# Patient Record
Sex: FEMALE | Race: WHITE | NOT HISPANIC OR LATINO
[De-identification: names, ages, dates, MRNs, and addresses within clinical notes are randomized per-mention and may not be internally consistent; named-entity substitution may affect disease eponyms.]

---

## 2017-11-27 ENCOUNTER — APPOINTMENT (OUTPATIENT)
Dept: ENDOCRINOLOGY | Facility: CLINIC | Age: 22
End: 2017-11-27
Payer: COMMERCIAL

## 2017-11-27 VITALS
BODY MASS INDEX: 27.6 KG/M2 | HEART RATE: 102 BPM | HEIGHT: 62 IN | WEIGHT: 150 LBS | DIASTOLIC BLOOD PRESSURE: 70 MMHG | OXYGEN SATURATION: 98 % | SYSTOLIC BLOOD PRESSURE: 112 MMHG

## 2017-11-27 DIAGNOSIS — Z78.9 OTHER SPECIFIED HEALTH STATUS: ICD-10-CM

## 2017-11-27 PROCEDURE — 99204 OFFICE O/P NEW MOD 45 MIN: CPT | Mod: 25

## 2017-11-27 PROCEDURE — 76536 US EXAM OF HEAD AND NECK: CPT

## 2017-11-29 LAB
T4 FREE SERPL-MCNC: 1.3 NG/DL
THYROGLOB AB SERPL-ACNC: 58.8 IU/ML
THYROPEROXIDASE AB SERPL IA-ACNC: 23.6 IU/ML
TSH SERPL-ACNC: 0.92 UIU/ML

## 2017-12-15 ENCOUNTER — RX RENEWAL (OUTPATIENT)
Age: 22
End: 2017-12-15

## 2018-12-31 ENCOUNTER — RX RENEWAL (OUTPATIENT)
Age: 23
End: 2018-12-31

## 2019-03-18 ENCOUNTER — APPOINTMENT (OUTPATIENT)
Dept: ENDOCRINOLOGY | Facility: CLINIC | Age: 24
End: 2019-03-18
Payer: COMMERCIAL

## 2019-03-18 VITALS
DIASTOLIC BLOOD PRESSURE: 70 MMHG | WEIGHT: 149 LBS | HEIGHT: 62 IN | OXYGEN SATURATION: 99 % | BODY MASS INDEX: 27.42 KG/M2 | SYSTOLIC BLOOD PRESSURE: 110 MMHG | HEART RATE: 99 BPM

## 2019-03-18 PROCEDURE — 99213 OFFICE O/P EST LOW 20 MIN: CPT

## 2019-03-18 RX ORDER — METRONIDAZOLE 7.5 MG/G
0.75 GEL TOPICAL
Qty: 45 | Refills: 0 | Status: DISCONTINUED | COMMUNITY
Start: 2019-02-07

## 2019-03-18 NOTE — PHYSICAL EXAM
[Alert] : alert [No Acute Distress] : no acute distress [Well Nourished] : well nourished [Well Developed] : well developed [Normal Sclera/Conjunctiva] : normal sclera/conjunctiva [EOMI] : extra ocular movement intact [Normal Oropharynx] : the oropharynx was normal [No Respiratory Distress] : no respiratory distress [Thyroid Not Enlarged] : the thyroid was not enlarged [Supple] : the neck was supple [No Accessory Muscle Use] : no accessory muscle use [Normal Rate and Effort] : normal respiratory rhythm and effort [Normal Rate] : heart rate was normal  [Clear to Auscultation] : lungs were clear to auscultation bilaterally [Normal S1, S2] : normal S1 and S2 [Regular Rhythm] : with a regular rhythm [Normal Bowel Sounds] : normal bowel sounds [No Edema] : there was no peripheral edema [Soft] : abdomen soft [Not Tender] : non-tender [Not Distended] : not distended [No Spinal Tenderness] : no spinal tenderness [No Stigmata of Cushings Syndrome] : no stigmata of cushings syndrome [Normal Gait] : normal gait [No Clubbing, Cyanosis] : no clubbing  or cyanosis of the fingernails [Normal Strength/Tone] : muscle strength and tone were normal [No Motor Deficits] : the motor exam was normal [No Tremors] : no tremors [Oriented x3] : oriented to person, place, and time [Normal Affect] : the affect was normal [Normal Mood] : the mood was normal [Acanthosis Nigricans] : no acanthosis nigricans [Kyphosis] : no kyphosis present

## 2019-03-18 NOTE — HISTORY OF PRESENT ILLNESS
[FreeTextEntry1] : 22 y/o F w/ hx of hypothyroidism diagnosed 10/2016 on evaluation for "swollen neck" also w/ hx of difficulty losing weight. Thyroid function tests "abnormal" referred to Dr. Yeh who started on levothyroxine 75 mcg  daily. Decreased to 6 days a week 7/2017 due to hair loss. Seen initially by me 11/2017 with TSH of 0.97 Free T4 1.3 TPO Ab neg. and TG Ab +. Had not noticed much improvement on lower dose. Takes daily in the morning on empty stomach. No missed doses. No known family hx of thyroid condition. Has noticed less swelling of her neck. Denies dysphagia, dysphonia. No radiation exposure. never on lithium or amiodarone. Denies palpitations. +difficulty losing weight. Weight stable. Denies constipation, diarrhea. +cold intolerance, hair loss, dry skin and fatigue. LMP 2/15/19 should be coming tomorrow. Regular. Had normal thyroid US in 2017. No fevers or chills.

## 2019-03-18 NOTE — DATA REVIEWED
[FreeTextEntry1] : Thyroid Ultrasound Report 11/27/17:\par \par Technique: multiple real time longitudinal and transverse images were obtained using a high resolution ultrasound with a linear transducer, [employer] Blair e 2008 model, 10-12 MHz frequencies. All measurements will be reported as longitudinal x ana-posterior x transverse. \par Comparison: None. \par \par Indication: goiter and hypothyroidism. \par \par Findings: \par The right thyroid lobe measures 3.58 x 1.44 x 1.14 cm. The left thyroid lobe measures 3.89 x 1.07 x 1.16 cm. The isthmus measures 0.38 cm. \par The right thyroid lobe has a homogenous parenchyma. \par The left thyroid lobe has a homogeneous parenchyma . \par \par There are no distinct nodules visualized. \par

## 2019-03-18 NOTE — PROCEDURE
[iSkoot e 2008 model, 10-12 MHz frequencies] : multiple real time longitudinal and transverse images were obtained using a high resolution ultrasound with a linear transducer, iSkoot e 2008 model, 10-12 MHz frequencies. All measurements will be reported as longitudinal x ana-posterior x transverse. [None] : None [Goiter] : goiter [Hypothyroidism] : hypothyroidism [There are no distinct nodules visualized.] : There are no distinct nodules visualized. [] : a homogeneous parenchyma  [FreeTextEntry1] : 3.58 x 1.44 x 1.14 [FreeTextEntry2] : 0.38 [FreeTextEntry5] : 3.89 x 1.07 x 1.16

## 2019-03-18 NOTE — REVIEW OF SYSTEMS
[Fatigue] : fatigue [Blurry Vision] : blurred vision [Headache] : headaches [All other systems negative] : All other systems negative [Hair Loss] : hair loss [Dry Skin] : dry skin [Cold Intolerance] : cold intolerant [Recent Weight Gain (___ Lbs)] : no recent weight gain [Recent Weight Loss (___ Lbs)] : no recent weight loss [Dysphagia] : no dysphagia [Dysphonia] : no dysphonia [Neck Pain] : no neck pain [Chest Pain] : no chest pain [Shortness Of Breath] : no shortness of breath [Palpitations] : no palpitations [Nausea] : no nausea [Vomiting] : no vomiting was observed [Constipation] : no constipation [Diarrhea] : no diarrhea [Polyuria] : no polyuria [Irregular Menses] : regular menses [Joint Pain] : no joint pain [Muscle Weakness] : no muscle weakness [Tremors] : no tremors [Polydipsia] : no polydipsia [Heat Intolerance] : heat tolerant [FreeTextEntry3] : with migraines [FreeTextEntry2] : difficulty losing weight [de-identified] : migraines

## 2019-03-18 NOTE — ASSESSMENT
[FreeTextEntry1] : 24 y/o F w/ hypothyroidism. Check TFTs today. Adjust dose as needed for goal TSH of 0.5-3. Thyroid US performed in 2017 given hx of neck swelling and hypothyroidism (see results section for full description) with normal appearing thyroid gland. Rule out other causes of cold intolerance and fatigue with CBC, vitamin D, and celiac screen.

## 2019-03-19 LAB
25(OH)D3 SERPL-MCNC: 33.1 NG/ML
BASOPHILS # BLD AUTO: 0.04 K/UL
BASOPHILS NFR BLD AUTO: 0.6 %
EOSINOPHIL # BLD AUTO: 0.11 K/UL
EOSINOPHIL NFR BLD AUTO: 1.7 %
HCT VFR BLD CALC: 38.4 %
HGB BLD-MCNC: 12.4 G/DL
IMM GRANULOCYTES NFR BLD AUTO: 0.5 %
LYMPHOCYTES # BLD AUTO: 1.59 K/UL
LYMPHOCYTES NFR BLD AUTO: 25 %
MAN DIFF?: NORMAL
MCHC RBC-ENTMCNC: 30.2 PG
MCHC RBC-ENTMCNC: 32.3 GM/DL
MCV RBC AUTO: 93.7 FL
MONOCYTES # BLD AUTO: 0.4 K/UL
MONOCYTES NFR BLD AUTO: 6.3 %
NEUTROPHILS # BLD AUTO: 4.19 K/UL
NEUTROPHILS NFR BLD AUTO: 65.9 %
PLATELET # BLD AUTO: 288 K/UL
RBC # BLD: 4.1 M/UL
RBC # FLD: 12.5 %
T4 FREE SERPL-MCNC: 1.2 NG/DL
TSH SERPL-ACNC: 1.83 UIU/ML
WBC # FLD AUTO: 6.36 K/UL

## 2019-03-20 LAB
TTG IGA SER IA-ACNC: <1.2 U/ML
TTG IGA SER-ACNC: NEGATIVE
TTG IGG SER IA-ACNC: 1.3 U/ML
TTG IGG SER IA-ACNC: NEGATIVE

## 2019-05-29 ENCOUNTER — APPOINTMENT (OUTPATIENT)
Dept: SURGERY | Facility: CLINIC | Age: 24
End: 2019-05-29
Payer: COMMERCIAL

## 2019-05-29 VITALS — HEIGHT: 63 IN | BODY MASS INDEX: 26.58 KG/M2 | WEIGHT: 150 LBS

## 2019-05-29 PROCEDURE — 99202 OFFICE O/P NEW SF 15 MIN: CPT | Mod: 25,57

## 2019-05-29 PROCEDURE — 10081 I&D PILONIDAL CYST COMP: CPT

## 2019-05-30 NOTE — HISTORY OF PRESENT ILLNESS
[de-identified] : This 23 year old woman developed a painful mass in the area overlying her "tailbone" several days ago. She denies fever or chills nor [previous similar problems.

## 2019-05-30 NOTE — ASSESSMENT
[FreeTextEntry1] : Long discussion regarding all options and risks\par \par I&D of large pilonidal abscess\par 1.0 % xylocaine\par Large amount of pus and blood clots drained\par Multiple deep loculations divide\par \par Wound care discussed \par To begin sitz baths\par Return in two days

## 2019-05-30 NOTE — PHYSICAL EXAM
[Normal Breath Sounds] : Normal breath sounds [Normal Heart Sounds] : normal heart sounds [Normal Rate and Rhythm] : normal rate and rhythm [Abdominal Masses] : No abdominal masses [Abdomen Tenderness] : ~T ~M No abdominal tenderness [de-identified] : nl [de-identified] : nl [de-identified] : nl [de-identified] : 3.5 cm. tender mass overlying the coccyx

## 2019-05-31 ENCOUNTER — APPOINTMENT (OUTPATIENT)
Dept: SURGERY | Facility: CLINIC | Age: 24
End: 2019-05-31
Payer: COMMERCIAL

## 2019-05-31 PROCEDURE — 99024 POSTOP FOLLOW-UP VISIT: CPT

## 2019-06-01 NOTE — HISTORY OF PRESENT ILLNESS
[de-identified] : The patient is feeling much better\par There is still some drainage as she continues the sitz baths

## 2019-06-17 ENCOUNTER — APPOINTMENT (OUTPATIENT)
Dept: SURGERY | Facility: CLINIC | Age: 24
End: 2019-06-17
Payer: COMMERCIAL

## 2019-06-17 DIAGNOSIS — Z83.438 FAMILY HISTORY OF OTHER DISORDER OF LIPOPROTEIN METABOLISM AND OTHER LIPIDEMIA: ICD-10-CM

## 2019-06-17 PROCEDURE — 99024 POSTOP FOLLOW-UP VISIT: CPT

## 2019-06-18 PROBLEM — Z83.438 FAMILY HISTORY OF HYPERLIPIDEMIA: Status: ACTIVE | Noted: 2017-11-27

## 2019-09-27 ENCOUNTER — RX RENEWAL (OUTPATIENT)
Age: 24
End: 2019-09-27

## 2020-02-26 ENCOUNTER — TRANSCRIPTION ENCOUNTER (OUTPATIENT)
Age: 25
End: 2020-02-26

## 2020-10-05 ENCOUNTER — EMERGENCY (EMERGENCY)
Facility: HOSPITAL | Age: 25
LOS: 1 days | Discharge: HOME | End: 2020-10-05
Attending: EMERGENCY MEDICINE | Admitting: EMERGENCY MEDICINE
Payer: COMMERCIAL

## 2020-10-05 VITALS
SYSTOLIC BLOOD PRESSURE: 126 MMHG | DIASTOLIC BLOOD PRESSURE: 84 MMHG | OXYGEN SATURATION: 98 % | HEART RATE: 94 BPM | HEIGHT: 62 IN | TEMPERATURE: 99 F | RESPIRATION RATE: 18 BRPM | WEIGHT: 149.91 LBS

## 2020-10-05 VITALS
HEART RATE: 87 BPM | SYSTOLIC BLOOD PRESSURE: 123 MMHG | DIASTOLIC BLOOD PRESSURE: 72 MMHG | RESPIRATION RATE: 17 BRPM | OXYGEN SATURATION: 99 %

## 2020-10-05 DIAGNOSIS — T75.4XXA ELECTROCUTION, INITIAL ENCOUNTER: ICD-10-CM

## 2020-10-05 PROCEDURE — 99283 EMERGENCY DEPT VISIT LOW MDM: CPT

## 2020-10-05 RX ORDER — IBUPROFEN 200 MG
1 TABLET ORAL
Qty: 20 | Refills: 0
Start: 2020-10-05 | End: 2020-10-09

## 2020-10-05 RX ORDER — DIAZEPAM 5 MG
5 TABLET ORAL ONCE
Refills: 0 | Status: DISCONTINUED | OUTPATIENT
Start: 2020-10-05 | End: 2020-10-05

## 2020-10-05 RX ORDER — IBUPROFEN 200 MG
600 TABLET ORAL ONCE
Refills: 0 | Status: COMPLETED | OUTPATIENT
Start: 2020-10-05 | End: 2020-10-05

## 2020-10-05 RX ORDER — DIAZEPAM 5 MG
1 TABLET ORAL
Qty: 9 | Refills: 0
Start: 2020-10-05 | End: 2020-10-07

## 2020-10-05 RX ADMIN — Medication 5 MILLIGRAM(S): at 19:19

## 2020-10-05 RX ADMIN — Medication 600 MILLIGRAM(S): at 19:19

## 2020-10-05 NOTE — ED PROVIDER NOTE - CLINICAL SUMMARY MEDICAL DECISION MAKING FREE TEXT BOX
25 y/o F presents to the ED with c/o electrical shock to the right 2nd-4th finger paresthesias x today s/p electrical shock while unplugging the portal heater. Reports she feels like her 3 fingers are "stuck together". She denies LOC, head injury, CP, SOB, motor deficits or all other complaints. PE as noted above. patient given valium and motrin. will dc with PCP f/u

## 2020-10-05 NOTE — ED PROVIDER NOTE - CARE PLAN
Principal Discharge DX:	Paresthesia  Secondary Diagnosis:	Electrocution and nonfatal effects of electric current, initial encounter

## 2020-10-05 NOTE — ED PROVIDER NOTE - NSFOLLOWUPINSTRUCTIONS_ED_ALL_ED_FT
Follow up with your primary care physician within 2-3 days   Take the prescribed medication as directed   Stay hydrated  Return to the ER if your symptoms worsen or for any other medical emergencies  *******************

## 2020-10-05 NOTE — ED PROVIDER NOTE - PHYSICAL EXAMINATION
msk/neuro: patient seen holding her 2nd-4th fingers together. +mild decreased sensation to the fingertips. Normal finger strength   patient able to move her fingers independently and make a fist  no neurovasc compromise on exam   good cap refill       patient well appearing

## 2020-10-05 NOTE — ED PROVIDER NOTE - OBJECTIVE STATEMENT
25 y/o F presents to the ED with c/o electrical shock to the right 2nd-4th finger paresthesias x today s/p electrical shock while unplugging the portal heater. Reports she feels like her 3 fingers are "stuck together". She denies LOC, head injury, CP, SOB, motor deficits or all other complaints

## 2020-10-05 NOTE — ED ADULT NURSE NOTE - OBJECTIVE STATEMENT
Pt reports unplugging space heater and getting electrical shock. Reports pain to 2nd, 3rd, 4th digits of right hand. No obvious injury seen to area. Radial pulses present.

## 2020-10-05 NOTE — ED PROVIDER NOTE - PATIENT PORTAL LINK FT
You can access the FollowMyHealth Patient Portal offered by [facility] by registering at the following website: [email or web address]. By joining [employer]’s FollowMyHealth portal, you will also be able to view your health information using other applications (apps) compatible with our system.

## 2021-03-31 PROBLEM — Z78.9 OTHER SPECIFIED HEALTH STATUS: Chronic | Status: ACTIVE | Noted: 2020-10-05

## 2021-04-02 ENCOUNTER — RX RENEWAL (OUTPATIENT)
Age: 26
End: 2021-04-02

## 2021-05-04 ENCOUNTER — APPOINTMENT (OUTPATIENT)
Dept: ENDOCRINOLOGY | Facility: CLINIC | Age: 26
End: 2021-05-04
Payer: COMMERCIAL

## 2021-05-04 VITALS
RESPIRATION RATE: 16 BRPM | DIASTOLIC BLOOD PRESSURE: 60 MMHG | OXYGEN SATURATION: 99 % | TEMPERATURE: 98.6 F | WEIGHT: 156 LBS | HEIGHT: 63 IN | SYSTOLIC BLOOD PRESSURE: 118 MMHG | HEART RATE: 104 BPM | BODY MASS INDEX: 27.64 KG/M2

## 2021-05-04 PROCEDURE — 76536 US EXAM OF HEAD AND NECK: CPT

## 2021-05-04 PROCEDURE — 99204 OFFICE O/P NEW MOD 45 MIN: CPT | Mod: 25

## 2021-05-04 PROCEDURE — 36415 COLL VENOUS BLD VENIPUNCTURE: CPT

## 2021-05-04 PROCEDURE — 99072 ADDL SUPL MATRL&STAF TM PHE: CPT

## 2021-05-04 NOTE — PROCEDURE
[First To File e 2008 model, 10-12 MHz frequencies] : multiple real time longitudinal and transverse images were obtained using a high resolution ultrasound with a linear transducer, [employer] e 2008 model, 10-12 MHz frequencies. All measurements will be reported as longitudinal x ana-posterior x transverse. [Hypothyroidism] : hypothyroidism [] : a heterogeneous parenchyma [FreeTextEntry1] : 2.88 x 1.71 x 1.44  [FreeTextEntry5] : 2.10 x 1.24 x 1.60  [FreeTextEntry2] : 0.52

## 2021-05-04 NOTE — PROCEDURE
Unable to Assess [[employer] e 2008 model, 10-12 MHz frequencies] : multiple real time longitudinal and transverse images were obtained using a high resolution ultrasound with a linear transducer, [employer]orm[employer] e 2008 model, 10-12 MHz frequencies. All measurements will be reported as longitudinal x ana-posterior x transverse. [Hypothyroidism] : hypothyroidism [] : a heterogeneous parenchyma [FreeTextEntry1] : 2.88 x 1.71 x 1.44  [FreeTextEntry5] : 2.10 x 1.24 x 1.60  [FreeTextEntry2] : 0.52

## 2021-05-04 NOTE — HISTORY OF PRESENT ILLNESS
[FreeTextEntry1] : Ms. DAMON  is a 25 year old female who presents for initial endocrine evaluation. She presents with regard to a history of hypothyroidism first dx in October of 2016. She was first dx when anterior neck noted to be enlarged with TSH elevation. Pt denies previous US studies. Had been followed by Dr. Lam and has continued on LT4 75 mcg daily since first diagnosed with hypothyroidism.\par She does c/o fatigue, describes symptoms similar to when she was first diagnosed with hypothyroidism. \par Menses have been regular on OCP, previously cycles lasted about 40-50 days. Taking Lo Loestrin OCP. \par Denies palpitations tremors, anxiousness or severe fatigue, Weight has been stable. \par She does admit to an irregular sleep pattern - she does go to sleep for about 2-3 hours, is awake for several hours, and then goes back to sleep.   \par \par Additional medical history includes that of oral surgery 02/2021. \par Off Vitamin D3. \par Did have exposure to COVID but denies infection. \par \par FH: denies hx of thyroid disease. \par

## 2021-05-07 ENCOUNTER — TRANSCRIPTION ENCOUNTER (OUTPATIENT)
Age: 26
End: 2021-05-07

## 2021-05-19 ENCOUNTER — TRANSCRIPTION ENCOUNTER (OUTPATIENT)
Age: 26
End: 2021-05-19

## 2021-06-12 ENCOUNTER — NON-APPOINTMENT (OUTPATIENT)
Age: 26
End: 2021-06-12

## 2021-06-12 LAB
ALBUMIN SERPL ELPH-MCNC: 4.5 G/DL
ALP BLD-CCNC: 61 U/L
ALT SERPL-CCNC: 14 U/L
ANION GAP SERPL CALC-SCNC: 10 MMOL/L
AST SERPL-CCNC: 11 U/L
BASOPHILS # BLD AUTO: 0.05 K/UL
BASOPHILS NFR BLD AUTO: 0.7 %
BILIRUB SERPL-MCNC: 0.4 MG/DL
BUN SERPL-MCNC: 13 MG/DL
CALCIUM SERPL-MCNC: 9.6 MG/DL
CHLORIDE SERPL-SCNC: 105 MMOL/L
CO2 SERPL-SCNC: 24 MMOL/L
CREAT SERPL-MCNC: 0.64 MG/DL
EOSINOPHIL # BLD AUTO: 0.08 K/UL
EOSINOPHIL NFR BLD AUTO: 1.1 %
FERRITIN SERPL-MCNC: 121 NG/ML
GLUCOSE SERPL-MCNC: 85 MG/DL
HCT VFR BLD CALC: 38.6 %
HGB BLD-MCNC: 12.9 G/DL
IMM GRANULOCYTES NFR BLD AUTO: 0.8 %
IRON SERPL-MCNC: 78 UG/DL
LYMPHOCYTES # BLD AUTO: 1.46 K/UL
LYMPHOCYTES NFR BLD AUTO: 20.2 %
MAN DIFF?: NORMAL
MCHC RBC-ENTMCNC: 30.6 PG
MCHC RBC-ENTMCNC: 33.4 GM/DL
MCV RBC AUTO: 91.7 FL
MONOCYTES # BLD AUTO: 0.34 K/UL
MONOCYTES NFR BLD AUTO: 4.7 %
NEUTROPHILS # BLD AUTO: 5.22 K/UL
NEUTROPHILS NFR BLD AUTO: 72.5 %
PLATELET # BLD AUTO: 314 K/UL
POTASSIUM SERPL-SCNC: 3.9 MMOL/L
PROT SERPL-MCNC: 8 G/DL
RBC # BLD: 4.21 M/UL
RBC # FLD: 12.6 %
SODIUM SERPL-SCNC: 139 MMOL/L
T3FREE SERPL-MCNC: 2.81 PG/ML
T4 FREE SERPL-MCNC: 1.5 NG/DL
THYROGLOB AB SERPL-ACNC: 23.8 IU/ML
THYROPEROXIDASE AB SERPL IA-ACNC: 49.1 IU/ML
TSH SERPL-ACNC: 0.73 UIU/ML
WBC # FLD AUTO: 7.21 K/UL

## 2021-07-30 NOTE — ED ADULT NURSE NOTE - PMH
Detail Level: Detailed No pertinent past medical history <<----- Click to add NO pertinent Past Medical History

## 2021-12-17 ENCOUNTER — TRANSCRIPTION ENCOUNTER (OUTPATIENT)
Age: 26
End: 2021-12-17

## 2021-12-19 ENCOUNTER — TRANSCRIPTION ENCOUNTER (OUTPATIENT)
Age: 26
End: 2021-12-19

## 2022-02-28 ENCOUNTER — RX RENEWAL (OUTPATIENT)
Age: 27
End: 2022-02-28

## 2022-03-05 ENCOUNTER — RX RENEWAL (OUTPATIENT)
Age: 27
End: 2022-03-05

## 2022-05-31 ENCOUNTER — APPOINTMENT (OUTPATIENT)
Dept: ENDOCRINOLOGY | Facility: CLINIC | Age: 27
End: 2022-05-31
Payer: COMMERCIAL

## 2022-05-31 VITALS
SYSTOLIC BLOOD PRESSURE: 118 MMHG | DIASTOLIC BLOOD PRESSURE: 72 MMHG | WEIGHT: 140 LBS | RESPIRATION RATE: 15 BRPM | HEART RATE: 64 BPM | OXYGEN SATURATION: 98 % | TEMPERATURE: 98.6 F

## 2022-05-31 DIAGNOSIS — Z83.2 FAMILY HISTORY OF DISEASES OF THE BLOOD AND BLOOD-FORMING ORGANS AND CERTAIN DISORDERS INVOLVING THE IMMUNE MECHANISM: ICD-10-CM

## 2022-05-31 PROCEDURE — 36415 COLL VENOUS BLD VENIPUNCTURE: CPT

## 2022-05-31 PROCEDURE — 99214 OFFICE O/P EST MOD 30 MIN: CPT | Mod: 25

## 2022-05-31 RX ORDER — LEVOTHYROXINE SODIUM 0.07 MG/1
75 TABLET ORAL DAILY
Qty: 90 | Refills: 0 | Status: DISCONTINUED | COMMUNITY
Start: 2018-12-31 | End: 2022-05-31

## 2022-05-31 NOTE — HISTORY OF PRESENT ILLNESS
[FreeTextEntry1] : Ms. DAMON is a 26 year old female who returns for endocrine evaluation. She presents with regard to a history of hypothyroidism first dx in October of 2016. She was first dx when anterior neck noted to be enlarged with TSH elevation. Pt denies previous US studies. Had been followed by Dr. Lam and was on LT4 75 mcg daily since first diagnosed with hypothyroidism. Patient was off LT4 for 3-4 months due to no refills and had blood work done with PMD Dr. Grzegorz Rick. \par \par Labs \par from 4/12/22 showed TSH 0.9, Ft3 2.8,  vitamin D 23 (started once weekly vitamin d3 59203 once weekly), cholesterol 246, , A1c 4.8% \par \par She recently started lipitor 10 mg\par \par She did not resume LT4 since blood work and has been off for the last 5 months. she denies any palpitations, sob , tremors, anxiety, weight changes, temperature intolerance, severe fatigue, change in bowel habits, changes in mood. she also denies skin, hair, or nail changes.\par She reports general fatigue that has been present since college. \par \par \par Menses have been regular on OCP, previously cycles lasted about 40-50 days. Taking Lo Loestrin OCP. \par She does admit to an irregular sleep pattern - she does go to sleep for about 2-3 hours, is awake for several hours, and then goes back to sleep. She does sleep a lot which has stayed constant since last visit. \par Menses lasts about 5 days. Denies flow being heavy.\par \par Patient does note increased fatigue of late. Denies difficulty falling asleep or difficulty falling back asleep when she wakes up at night. \par \par Additional medical history includes that of oral surgery 02/2021. \par FH: denies hx of thyroid disease. \par \par Denies covid infection.

## 2022-06-01 DIAGNOSIS — R77.8 OTHER SPECIFIED ABNORMALITIES OF PLASMA PROTEINS: ICD-10-CM

## 2022-06-01 LAB
25(OH)D3 SERPL-MCNC: 29.3 NG/ML
ALBUMIN SERPL ELPH-MCNC: 5.1 G/DL
ALP BLD-CCNC: 68 U/L
ALT SERPL-CCNC: 24 U/L
ANION GAP SERPL CALC-SCNC: 16 MMOL/L
AST SERPL-CCNC: 20 U/L
BASOPHILS # BLD AUTO: 0.06 K/UL
BASOPHILS NFR BLD AUTO: 0.6 %
BILIRUB SERPL-MCNC: 0.8 MG/DL
BUN SERPL-MCNC: 14 MG/DL
CALCIUM SERPL-MCNC: 9.8 MG/DL
CHLORIDE SERPL-SCNC: 101 MMOL/L
CO2 SERPL-SCNC: 22 MMOL/L
CREAT SERPL-MCNC: 0.57 MG/DL
EGFR: 129 ML/MIN/1.73M2
EOSINOPHIL # BLD AUTO: 0.03 K/UL
EOSINOPHIL NFR BLD AUTO: 0.3 %
FERRITIN SERPL-MCNC: 86 NG/ML
FOLATE SERPL-MCNC: 12.2 NG/ML
GLUCOSE SERPL-MCNC: 75 MG/DL
HCT VFR BLD CALC: 42.8 %
HGB BLD-MCNC: 13.7 G/DL
IMM GRANULOCYTES NFR BLD AUTO: 0.6 %
IRON SERPL-MCNC: 105 UG/DL
LYMPHOCYTES # BLD AUTO: 1.66 K/UL
LYMPHOCYTES NFR BLD AUTO: 16.8 %
MAN DIFF?: NORMAL
MCHC RBC-ENTMCNC: 30.6 PG
MCHC RBC-ENTMCNC: 32 GM/DL
MCV RBC AUTO: 95.5 FL
MONOCYTES # BLD AUTO: 0.54 K/UL
MONOCYTES NFR BLD AUTO: 5.4 %
NEUTROPHILS # BLD AUTO: 7.56 K/UL
NEUTROPHILS NFR BLD AUTO: 76.3 %
PLATELET # BLD AUTO: 366 K/UL
POTASSIUM SERPL-SCNC: 4.3 MMOL/L
PROT SERPL-MCNC: 8.5 G/DL
RBC # BLD: 4.48 M/UL
RBC # FLD: 13.8 %
SODIUM SERPL-SCNC: 140 MMOL/L
T3FREE SERPL-MCNC: 2.77 PG/ML
T4 FREE SERPL-MCNC: 1.2 NG/DL
THYROGLOB AB SERPL-ACNC: <20 IU/ML
THYROPEROXIDASE AB SERPL IA-ACNC: 15.1 IU/ML
TSH SERPL-ACNC: 0.7 UIU/ML
VIT B12 SERPL-MCNC: 584 PG/ML
WBC # FLD AUTO: 9.91 K/UL

## 2022-06-06 PROBLEM — R77.8 ELEVATED TOTAL PROTEIN: Status: ACTIVE | Noted: 2022-06-06

## 2023-02-25 ENCOUNTER — NON-APPOINTMENT (OUTPATIENT)
Age: 28
End: 2023-02-25

## 2023-02-26 ENCOUNTER — EMERGENCY (EMERGENCY)
Facility: HOSPITAL | Age: 28
LOS: 1 days | Discharge: HOME | End: 2023-02-26
Attending: EMERGENCY MEDICINE | Admitting: EMERGENCY MEDICINE
Payer: COMMERCIAL

## 2023-02-26 VITALS
RESPIRATION RATE: 16 BRPM | WEIGHT: 139.99 LBS | HEART RATE: 85 BPM | HEIGHT: 62 IN | TEMPERATURE: 97 F | DIASTOLIC BLOOD PRESSURE: 86 MMHG | OXYGEN SATURATION: 97 % | SYSTOLIC BLOOD PRESSURE: 133 MMHG

## 2023-02-26 VITALS
HEART RATE: 88 BPM | SYSTOLIC BLOOD PRESSURE: 107 MMHG | RESPIRATION RATE: 16 BRPM | DIASTOLIC BLOOD PRESSURE: 70 MMHG | OXYGEN SATURATION: 99 %

## 2023-02-26 PROCEDURE — 99284 EMERGENCY DEPT VISIT MOD MDM: CPT

## 2023-02-26 PROCEDURE — 93971 EXTREMITY STUDY: CPT

## 2023-02-26 PROCEDURE — 93971 EXTREMITY STUDY: CPT | Mod: 26,LT

## 2023-02-26 PROCEDURE — 99284 EMERGENCY DEPT VISIT MOD MDM: CPT | Mod: 25

## 2023-02-26 NOTE — ED PROVIDER NOTE - OBJECTIVE STATEMENT
Pt 28 yo f no pmhx sent in from urgent care to r/o DVT in the LLE. Pt states that 2 weeks ago she took a long flight to [place] and a few days ago she started having left calf pain and left leg swelling. Pt with no PMH. Pt states that she takes birth control pills. Pt denies chest pain, SOB, fevers/chills or any other symptoms.

## 2023-02-26 NOTE — ED PROVIDER NOTE - NSFOLLOWUPINSTRUCTIONS_ED_ALL_ED_FT
Your ultrasound is negative in the emergency department today.  There is no sign of any clotting in the left leg.  If pain or swelling continues or worsens, it is imperative that you follow-up for repeat ultrasound in 7 to 10 days.  Your primary care doctor can arrange this.  Since you have no primary care doctor, we will notify you to help you set up an appointment with a new primary care doctor.  If there is any sudden worsening or concerns, return to the emergency department immediately.

## 2023-02-26 NOTE — ED PROVIDER NOTE - PATIENT PORTAL LINK FT
You can access the FollowMyHealth Patient Portal offered by [facility] by registering at the following website: [email or web address]. By joining [employer]’s FollowMyHealth portal, you will also be able to view your health information using other applications (apps) compatible with our system.

## 2023-02-26 NOTE — ED PROVIDER NOTE - PHYSICAL EXAMINATION
Gen: Well appearing in NAD  Head: NC/AT  Neck: trachea midline  Resp:  No distress, cta b/l  Heart: RRR  Ext: LLE calf larger than right. no edema or tenderness. from, soft compartments, sensation intact, cap refill <3  Neuro:  A&O appears non focal  Skin:  Warm and dry as visualized  Psych:  Normal affect and mood

## 2023-02-26 NOTE — ED ADULT NURSE NOTE - OBJECTIVE STATEMENT
Pt sent in from urgent care to r/o DVT in the LLE. Pt states that 2 weeks ago she took a long flight to [place] and a few days ago she started having left calf pain and left leg swelling. Pt with no PMH. Pt states that she takes birth control pills. Pt denies chest pain, SOB, fevers/chills or any other symptoms.

## 2023-02-26 NOTE — ED PROVIDER NOTE - ATTENDING APP SHARED VISIT CONTRIBUTION OF CARE
Jomar with [facility]. Pt 28 yo f no pmhx sent in from urgent care to r/o DVT in the LLE. Pt states that 2 weeks ago she took a long flight to [place] and a few days ago she started having left calf pain and left leg swelling. Pt with no PMH. Pt states that she takes birth control pills. Pt denies chest pain, SOB, fevers/chills or any other symptoms.  left calf larger than right. will US and reassess    US neg; Pt advised for f/u with PCP and repeat US in 7 to 10 days.   Pt has no PCP. Therefore, info entered in binder to arrange pcp f/u.     Worsening, continued or ANY new concerning symptoms return to the emergency department.    I performed a face to face bedside interview with patient regarding history of present illness, review of symptoms and past medical history. I completed an independent physical exam.  I have discussed the patient's plan of care with Physician Assistant (PA). I agree with note as stated above, having amended the EMR as needed to reflect my findings.   This includes History of Present Illness, HIV, Past Medical/Surgical/Family/Social History, Allergies and Home Medications, Review of Systems, Physical Exam, and any Progress Notes during the time I functioned as the attending physician for this patient.

## 2023-02-26 NOTE — ED PROVIDER NOTE - NS ED ATTENDING STATEMENT MOD
This was a shared visit with the KANG. I reviewed and verified the documentation and independently performed the documented:

## 2023-02-26 NOTE — ED PROVIDER NOTE - CLINICAL SUMMARY MEDICAL DECISION MAKING FREE TEXT BOX
Pt 26 yo f no pmhx sent in from urgent care to r/o DVT in the LLE. Pt states that 2 weeks ago she took a long flight to [place] and a few days ago she started having left calf pain and left leg swelling. Pt with no PMH. Pt states that she takes birth control pills. Pt denies chest pain, SOB, fevers/chills or any other symptoms.  left calf larger than right. will US and reassess Pt 28 yo f no pmhx sent in from urgent care to r/o DVT in the LLE. Pt states that 2 weeks ago she took a long flight to [place] and a few days ago she started having left calf pain and left leg swelling. Pt with no PMH. Pt states that she takes birth control pills. Pt denies chest pain, SOB, fevers/chills or any other symptoms.  left calf larger than right. will US and reassess    US neg; Pt advised for f/u with PCP and repeat US in 7 to 10 days.   Pt has no PCP. Therefore, info entered in binder to arrange pcp f/u.     Worsening, continued or ANY new concerning symptoms return to the emergency department.

## 2023-03-07 ENCOUNTER — APPOINTMENT (OUTPATIENT)
Dept: FAMILY MEDICINE | Facility: CLINIC | Age: 28
End: 2023-03-07
Payer: COMMERCIAL

## 2023-03-07 VITALS
DIASTOLIC BLOOD PRESSURE: 74 MMHG | RESPIRATION RATE: 16 BRPM | WEIGHT: 155 LBS | SYSTOLIC BLOOD PRESSURE: 120 MMHG | BODY MASS INDEX: 27.46 KG/M2 | HEIGHT: 63 IN | OXYGEN SATURATION: 98 % | TEMPERATURE: 98 F | HEART RATE: 100 BPM

## 2023-03-07 DIAGNOSIS — H93.A9 PULSATILE TINNITUS, UNSPECIFIED EAR: ICD-10-CM

## 2023-03-07 DIAGNOSIS — M79.89 OTHER SPECIFIED SOFT TISSUE DISORDERS: ICD-10-CM

## 2023-03-07 PROBLEM — Z83.2 FAMILY HISTORY OF ANEMIA: Status: ACTIVE | Noted: 2023-03-07

## 2023-03-07 PROCEDURE — 99204 OFFICE O/P NEW MOD 45 MIN: CPT

## 2023-03-07 RX ORDER — UBIDECARENONE 200 MG
200 TABLET ORAL
Refills: 0 | Status: COMPLETED | COMMUNITY
Start: 2021-05-19 | End: 2023-03-07

## 2023-03-07 RX ORDER — ADHESIVE TAPE 3"X 2.3 YD
50 MCG TAPE, NON-MEDICATED TOPICAL
Refills: 0 | Status: COMPLETED | COMMUNITY
Start: 2021-05-19 | End: 2023-03-07

## 2023-03-09 LAB
ALBUMIN SERPL ELPH-MCNC: 4.6 G/DL
ALP BLD-CCNC: 57 U/L
ALT SERPL-CCNC: 16 U/L
ANION GAP SERPL CALC-SCNC: 15 MMOL/L
AST SERPL-CCNC: 14 U/L
BASOPHILS # BLD AUTO: 0.05 K/UL
BASOPHILS NFR BLD AUTO: 1 %
BILIRUB SERPL-MCNC: 0.4 MG/DL
BUN SERPL-MCNC: 13 MG/DL
CALCIUM SERPL-MCNC: 9.8 MG/DL
CHLORIDE SERPL-SCNC: 102 MMOL/L
CO2 SERPL-SCNC: 23 MMOL/L
CREAT SERPL-MCNC: 0.6 MG/DL
CRP SERPL-MCNC: <3 MG/L
EGFR: 127 ML/MIN/1.73M2
EOSINOPHIL # BLD AUTO: 0.08 K/UL
EOSINOPHIL NFR BLD AUTO: 1.6 %
ERYTHROCYTE [SEDIMENTATION RATE] IN BLOOD BY WESTERGREN METHOD: 19 MM/HR
GLUCOSE SERPL-MCNC: 85 MG/DL
HCT VFR BLD CALC: 42.2 %
HGB BLD-MCNC: 14 G/DL
IMM GRANULOCYTES NFR BLD AUTO: 0.6 %
LYMPHOCYTES # BLD AUTO: 1.25 K/UL
LYMPHOCYTES NFR BLD AUTO: 25.2 %
MAN DIFF?: NORMAL
MCHC RBC-ENTMCNC: 30.9 PG
MCHC RBC-ENTMCNC: 33.2 GM/DL
MCV RBC AUTO: 93.2 FL
MONOCYTES # BLD AUTO: 0.28 K/UL
MONOCYTES NFR BLD AUTO: 5.6 %
NEUTROPHILS # BLD AUTO: 3.27 K/UL
NEUTROPHILS NFR BLD AUTO: 66 %
PLATELET # BLD AUTO: 301 K/UL
POTASSIUM SERPL-SCNC: 4.4 MMOL/L
PROT SERPL-MCNC: 7.8 G/DL
RBC # BLD: 4.53 M/UL
RBC # FLD: 12.5 %
SODIUM SERPL-SCNC: 140 MMOL/L
WBC # FLD AUTO: 4.96 K/UL

## 2023-03-10 ENCOUNTER — APPOINTMENT (OUTPATIENT)
Dept: ULTRASOUND IMAGING | Facility: CLINIC | Age: 28
End: 2023-03-10
Payer: COMMERCIAL

## 2023-03-10 ENCOUNTER — APPOINTMENT (OUTPATIENT)
Dept: ULTRASOUND IMAGING | Facility: CLINIC | Age: 28
End: 2023-03-10

## 2023-03-10 PROCEDURE — 93971 EXTREMITY STUDY: CPT | Mod: LT

## 2023-03-11 NOTE — HISTORY OF PRESENT ILLNESS
[de-identified] : 26 yo F Vitamin D deficiency, Hashimoto's presenting today for ER follow-up.  \par On 2/25 her older brother noticed that her left calf was greater in size than her R calf. At the time she noticed that she did feel some discomfort on the side of her calf. She was seen at urgent care and sent to the ED to r/o DVT in the setting of recent flight (came back from [place] Feb 16th) and being on birth control. \par The US LLE was negative for DVT in the ED, she was recommended to follow-up with PCP for repeat imaging in 7-10 days.  \par Since the discomfort is no longer in her calf, she feels a discomfort in her L knee/back of thigh. She also has sometimes felt a soreness in her R upper arm, when it comes on lasts about 20 minutes to an hour.  \par She also noticed ringing in her ear, described a pulsatile, last week, that has been intermittent, last 5 seconds at most. \par She reports that the calf size/swelling has decreased over time. \par Denies any MSK injuries, recent tick/bug bites. No new headaches, dizziness, focal weakness, joint pain, joint swelling, tick bites. \par \par social: lives with parents, works as

## 2023-03-11 NOTE — REVIEW OF SYSTEMS
[Muscle Pain] : muscle pain [Negative] : Integumentary [Fever] : no fever [Chills] : no chills [Joint Stiffness] : no joint stiffness [Joint Swelling] : no joint swelling [Muscle Weakness] : no muscle weakness

## 2023-03-11 NOTE — PLAN
[FreeTextEntry1] : -appreciated ED note and US done in the ED \par -f/u repeat US including non vascular \par -f/u labs \par -call/follow-up in office if any worsening symptoms in the interm

## 2023-03-11 NOTE — PHYSICAL EXAM
[Normal] : normal rate, regular rhythm, normal S1 and S2 and no murmur heard [de-identified] : some cerumen b/l

## 2023-03-13 ENCOUNTER — TRANSCRIPTION ENCOUNTER (OUTPATIENT)
Age: 28
End: 2023-03-13

## 2023-03-14 ENCOUNTER — LABORATORY RESULT (OUTPATIENT)
Age: 28
End: 2023-03-14

## 2023-03-15 ENCOUNTER — NON-APPOINTMENT (OUTPATIENT)
Age: 28
End: 2023-03-15

## 2023-03-20 LAB
ANA SER IF-ACNC: NEGATIVE
CCP AB SER IA-ACNC: <8 UNITS
CK SERPL-CCNC: 49 U/L
CRP SERPL-MCNC: <3 MG/L
DSDNA AB SER-ACNC: <12 IU/ML
ERYTHROCYTE [SEDIMENTATION RATE] IN BLOOD BY WESTERGREN METHOD: 26 MM/HR
RF+CCP IGG SER-IMP: NEGATIVE
RHEUMATOID FACT SER QL: <10 IU/ML

## 2023-03-22 ENCOUNTER — NON-APPOINTMENT (OUTPATIENT)
Age: 28
End: 2023-03-22

## 2023-03-22 ENCOUNTER — APPOINTMENT (OUTPATIENT)
Dept: FAMILY MEDICINE | Facility: CLINIC | Age: 28
End: 2023-03-22
Payer: COMMERCIAL

## 2023-03-22 VITALS
BODY MASS INDEX: 26.75 KG/M2 | SYSTOLIC BLOOD PRESSURE: 130 MMHG | TEMPERATURE: 98.4 F | RESPIRATION RATE: 16 BRPM | WEIGHT: 151 LBS | OXYGEN SATURATION: 98 % | HEART RATE: 97 BPM | HEIGHT: 63 IN | DIASTOLIC BLOOD PRESSURE: 80 MMHG

## 2023-03-22 PROCEDURE — 93000 ELECTROCARDIOGRAM COMPLETE: CPT

## 2023-03-22 PROCEDURE — 99214 OFFICE O/P EST MOD 30 MIN: CPT | Mod: 25

## 2023-03-22 NOTE — HISTORY OF PRESENT ILLNESS
[FreeTextEntry1] : follow-up   [de-identified] : 28 yo F Vitamin D deficiency, Hashimoto's presenting today for follow-up. \par She had repeat US which was negative for DVT, reports now b/l calves swelling has resolved.  \par The R upper arm ache/thigh discomfort have since improved. She now feels upper back/shoulder discomfort. Not associated with movement. Describe sensation as "she feels she needs to crack her muscle." \par She over the past week has had chest discomfort - which is located in various area of her upper torso - epigastric/left upper chest/left side. Denies discomfort being in her breast tissue. Describes the chest discomfort as dull, intermittent can last for elongated periods of time, does not relate it to anxiety/movement/food. On one occasion did relate it to deep breaths.  \par Still having pulsatile tinnitus has apt with ENT tomorrow, no headaches/dizziness/other neuro symptoms.   \par .

## 2023-03-22 NOTE — REVIEW OF SYSTEMS
[Chest Pain] : chest pain [Muscle Pain] : muscle pain [Back Pain] : back pain [Negative] : Respiratory [Shortness Of Breath] : no shortness of breath [Joint Pain] : no joint pain [Joint Swelling] : no joint swelling

## 2023-03-22 NOTE — PLAN
[FreeTextEntry1] : -for upper back - trial of moist heat, trial of Naproxen, discussed PT, she defers for now \par -chest pain - clinically at this point non specific, trial of meloxicam, EKG in office NSR, f/u with cardio, referral provided \par -for migratory muscle aches, trial of meloxicam, f/u with rheum, referral provided \par

## 2023-03-22 NOTE — PHYSICAL EXAM
[Normal] : normal rate, regular rhythm, normal S1 and S2 and no murmur heard [Declined Breast Exam] : declined breast exam

## 2023-03-23 ENCOUNTER — APPOINTMENT (OUTPATIENT)
Dept: OTOLARYNGOLOGY | Facility: CLINIC | Age: 28
End: 2023-03-23
Payer: COMMERCIAL

## 2023-03-23 VITALS
HEIGHT: 63 IN | OXYGEN SATURATION: 98 % | BODY MASS INDEX: 26.75 KG/M2 | TEMPERATURE: 96.2 F | SYSTOLIC BLOOD PRESSURE: 120 MMHG | HEART RATE: 77 BPM | DIASTOLIC BLOOD PRESSURE: 70 MMHG | WEIGHT: 151 LBS

## 2023-03-23 PROCEDURE — 99202 OFFICE O/P NEW SF 15 MIN: CPT

## 2023-03-23 NOTE — HISTORY OF PRESENT ILLNESS
[Tinnitus] : tinnitus [de-identified] : 27 year old female with pulsatile tinnitus in left ear intermittent for last week. She reports PCP said her ears are clogged ears. Denies otalgia, otorrhea, dizziness, hearing loss. \par She denies hypertension.

## 2023-03-23 NOTE — ASSESSMENT
[FreeTextEntry1] : 27 year old female with left tinnitus x 1 week, cerumen removed today. I discussed that if this tinnitus does not improve over the next several weeks, she should return for further workup.

## 2023-03-28 ENCOUNTER — NON-APPOINTMENT (OUTPATIENT)
Age: 28
End: 2023-03-28

## 2023-03-28 ENCOUNTER — APPOINTMENT (OUTPATIENT)
Dept: CARDIOLOGY | Facility: CLINIC | Age: 28
End: 2023-03-28
Payer: COMMERCIAL

## 2023-03-28 VITALS
HEIGHT: 63 IN | HEART RATE: 104 BPM | OXYGEN SATURATION: 98 % | BODY MASS INDEX: 26.75 KG/M2 | RESPIRATION RATE: 16 BRPM | WEIGHT: 151 LBS

## 2023-03-28 VITALS — HEART RATE: 88 BPM | SYSTOLIC BLOOD PRESSURE: 100 MMHG | DIASTOLIC BLOOD PRESSURE: 60 MMHG

## 2023-03-28 DIAGNOSIS — R07.9 CHEST PAIN, UNSPECIFIED: ICD-10-CM

## 2023-03-28 PROCEDURE — 99204 OFFICE O/P NEW MOD 45 MIN: CPT | Mod: 25

## 2023-03-28 PROCEDURE — 93000 ELECTROCARDIOGRAM COMPLETE: CPT

## 2023-03-28 NOTE — ASSESSMENT
[FreeTextEntry1] : In summary, the patient is a 27-year-old woman with a recent onset of chest pain.  Given her age and the duration of pain angina pectoris seems unlikely.  Pericarditis seems unlikely as well.\par \par I have advised her to return for echocardiography to rule out any structural heart disease\par \par I have advised her to return for exercise stress testing as well to see if there is any exertional component to her pain

## 2023-03-28 NOTE — REASON FOR VISIT
[Other: ____] : [unfilled] [FreeTextEntry1] : This is a 27-year-old woman referred for evaluation.  She has had various issues over the last several weeks.  It began with left lower extremity edema for which a DVT was ruled out.  She has had multiple muscle aches in her arms and neck and back.  She now the last week has had several episodes of chest pain in the midsternal region she describes them as dull lasting for hours at a time not brought on by anything.  She has no known history of heart disease.  She has no history of hypertension diabetes smoking or drug use.  She is a very rare alcohol user but has not drank anything in the last 6 weeks or so there is no significant family history of heart disease.

## 2023-04-06 NOTE — ED ADULT NURSE NOTE - NSFALLRSKHARMRISK_ED_ALL_ED
Prenatal Care Visit    Subjective   Chief Complaint   Patient presents with   • Routine Prenatal Visit     History:   Della is a  currently at 12w2d who presents for a prenatal care visit today.    Reports occasional N/V but overall seems to be improving. She reports two recent episodes of bleeding - once after intercourse and once seemingly unprovoked, however the second episode was more dark brown discharge that became pink and then stopped. She has not had any VB for about a week. She is Rh (+).      Objective   /78   Wt 61.2 kg (135 lb)   LMP 01/10/2023 (Exact Date)   BMI 23.91 kg/m²   Physical Exam:  Normal, gestational age-appropriate exam today        Assessment & Plan     1. IUP @ 12w2d  2. Routine care: I have reviewed the prenatal labs and ultrasound(s) today. I have reviewed the most recent prenatal progress note(s).      Diagnosis Plan   1. Encounter for supervision of other normal pregnancy in first trimester           Medication Management: Continue PNV    Topics discussed: Prenatal care milestones  None - she had no major complaints,questions or concerns   Tests next visit: none   Next visit: 4 week(s)     Sandra Brothers MD  Obstetrics and Gynecology  [facility]   no

## 2023-05-08 ENCOUNTER — APPOINTMENT (OUTPATIENT)
Dept: CARDIOLOGY | Facility: CLINIC | Age: 28
End: 2023-05-08
Payer: COMMERCIAL

## 2023-05-08 PROCEDURE — 93015 CV STRESS TEST SUPVJ I&R: CPT

## 2023-05-08 PROCEDURE — 93306 TTE W/DOPPLER COMPLETE: CPT

## 2023-05-09 ENCOUNTER — APPOINTMENT (OUTPATIENT)
Dept: RHEUMATOLOGY | Facility: CLINIC | Age: 28
End: 2023-05-09
Payer: COMMERCIAL

## 2023-05-09 ENCOUNTER — APPOINTMENT (OUTPATIENT)
Dept: ENDOCRINOLOGY | Facility: CLINIC | Age: 28
End: 2023-05-09
Payer: COMMERCIAL

## 2023-05-09 VITALS
BODY MASS INDEX: 27.64 KG/M2 | SYSTOLIC BLOOD PRESSURE: 113 MMHG | HEART RATE: 100 BPM | TEMPERATURE: 97.1 F | RESPIRATION RATE: 16 BRPM | WEIGHT: 156 LBS | HEIGHT: 63 IN | DIASTOLIC BLOOD PRESSURE: 73 MMHG | OXYGEN SATURATION: 98 %

## 2023-05-09 VITALS
OXYGEN SATURATION: 98 % | DIASTOLIC BLOOD PRESSURE: 60 MMHG | SYSTOLIC BLOOD PRESSURE: 100 MMHG | HEART RATE: 89 BPM | BODY MASS INDEX: 28 KG/M2 | TEMPERATURE: 97.9 F | WEIGHT: 158 LBS | HEIGHT: 63 IN

## 2023-05-09 DIAGNOSIS — R53.83 OTHER FATIGUE: ICD-10-CM

## 2023-05-09 DIAGNOSIS — G47.00 INSOMNIA, UNSPECIFIED: ICD-10-CM

## 2023-05-09 DIAGNOSIS — E06.3 AUTOIMMUNE THYROIDITIS: ICD-10-CM

## 2023-05-09 DIAGNOSIS — E03.9 HYPOTHYROIDISM, UNSPECIFIED: ICD-10-CM

## 2023-05-09 DIAGNOSIS — M79.10 MYALGIA, UNSPECIFIED SITE: ICD-10-CM

## 2023-05-09 PROCEDURE — 99214 OFFICE O/P EST MOD 30 MIN: CPT | Mod: 25

## 2023-05-09 PROCEDURE — 99204 OFFICE O/P NEW MOD 45 MIN: CPT

## 2023-05-09 PROCEDURE — 36415 COLL VENOUS BLD VENIPUNCTURE: CPT

## 2023-05-09 NOTE — HISTORY OF PRESENT ILLNESS
[FreeTextEntry1] : 27-year-old woman, referred for evaluation of muscle pain\par \par -Onset around February \par -Reports sudden onset of generalized muscle pain including the neck, upper back\par Bilateral arms and legs\par -She was in [place] in [place] and upon her return she noticed significant swelling of the left leg\par Was seen at the ER, ultrasound was negative for DVT\par Was seen by vascular and diagnosed with venous insufficiency\par -She continues to have muscle pain, occurs daily\par Can last from 20 minutes to an hour\par Resolves on its own\par No specific trigger or pattern\par No other associated symptoms such as fevers or chills, rashes, swollen joints..\par \par \par -on/off chest pain\par s.p cardiology evaluation\par underwent TTE and stress testing overall unrevealing as per patient\par

## 2023-05-09 NOTE — ASSESSMENT
[FreeTextEntry1] : #New onset of muscle pain, recurrent daily episodes\par No associated swelling/erythema, no constitutional symptoms, no rashes, no joint pain or joint swelling\par Exam today without evidence of focal deficit, muscle strength 5/5 upper extremity but 4/5 lower extremity/proximal secondary/limited by pain\par Blood work done with PMD reviewed with patient today, CK normal, SALVADOR negative\par Overall doubt an inflammatory myopathy as you would expect high CK and significant weakness on exam specially that this has been ongoing since February\par Would recommend neurology evaluation +/- EMG\par If evidence of myopathy or neuropathy would require additional imaging\par \par \par Patient aware of my assessment and plan, all questions answered.\par

## 2023-05-09 NOTE — CONSULT LETTER
[Dear  ___] : Dear  [unfilled], [Please see my note below.] : Please see my note below. [Sincerely,] : Sincerely, [FreeTextEntry3] : Mildred Mortensen MD\par , [facility] at [facility]\par Division of Rheumatology\par

## 2023-05-09 NOTE — PHYSICAL EXAM
[General Appearance - Alert] : alert [General Appearance - In No Acute Distress] : in no acute distress [Auscultation Breath Sounds / Voice Sounds] : lungs were clear to auscultation bilaterally [Heart Sounds] : normal S1 and S2 [Musculoskeletal - Swelling] : no joint swelling seen [FreeTextEntry1] : Muscle strength 5/5 upper extremity, 4/5 lower extremity/proximal secondary to pain

## 2023-05-10 LAB
25(OH)D3 SERPL-MCNC: 30.5 NG/ML
ALBUMIN SERPL ELPH-MCNC: 4.4 G/DL
ALP BLD-CCNC: 54 U/L
ALT SERPL-CCNC: 13 U/L
ANION GAP SERPL CALC-SCNC: 13 MMOL/L
AST SERPL-CCNC: 14 U/L
BASOPHILS # BLD AUTO: 0.04 K/UL
BASOPHILS NFR BLD AUTO: 0.6 %
BILIRUB SERPL-MCNC: 0.4 MG/DL
BUN SERPL-MCNC: 10 MG/DL
CALCIUM SERPL-MCNC: 9.3 MG/DL
CHLORIDE SERPL-SCNC: 102 MMOL/L
CO2 SERPL-SCNC: 21 MMOL/L
CREAT SERPL-MCNC: 0.54 MG/DL
EGFR: 130 ML/MIN/1.73M2
EOSINOPHIL # BLD AUTO: 0.11 K/UL
EOSINOPHIL NFR BLD AUTO: 1.8 %
FERRITIN SERPL-MCNC: 126 NG/ML
FOLATE SERPL-MCNC: 15.7 NG/ML
GLUCOSE SERPL-MCNC: 86 MG/DL
HCT VFR BLD CALC: 41.1 %
HGB BLD-MCNC: 13.2 G/DL
IMM GRANULOCYTES NFR BLD AUTO: 0.6 %
IRON SERPL-MCNC: 128 UG/DL
LYMPHOCYTES # BLD AUTO: 1.14 K/UL
LYMPHOCYTES NFR BLD AUTO: 18.4 %
MAN DIFF?: NORMAL
MCHC RBC-ENTMCNC: 30.4 PG
MCHC RBC-ENTMCNC: 32.1 GM/DL
MCV RBC AUTO: 94.7 FL
MONOCYTES # BLD AUTO: 0.27 K/UL
MONOCYTES NFR BLD AUTO: 4.3 %
NEUTROPHILS # BLD AUTO: 4.61 K/UL
NEUTROPHILS NFR BLD AUTO: 74.3 %
PLATELET # BLD AUTO: 335 K/UL
POTASSIUM SERPL-SCNC: 3.9 MMOL/L
PROT SERPL-MCNC: 7.6 G/DL
RBC # BLD: 4.34 M/UL
RBC # FLD: 13.1 %
SODIUM SERPL-SCNC: 136 MMOL/L
T3FREE SERPL-MCNC: 2.92 PG/ML
T4 FREE SERPL-MCNC: 1.1 NG/DL
TSH SERPL-ACNC: 1.28 UIU/ML
VIT B12 SERPL-MCNC: 511 PG/ML
WBC # FLD AUTO: 6.21 K/UL

## 2023-05-11 LAB
THYROGLOB AB SERPL-ACNC: <20 IU/ML
THYROPEROXIDASE AB SERPL IA-ACNC: 24 IU/ML

## 2023-05-12 LAB
UBIQUINONE10 SERPL-MCNC: 1.1 UG/ML
VIT B6 SERPL-MCNC: 6.3 UG/L

## 2023-05-15 NOTE — HISTORY OF PRESENT ILLNESS
[FreeTextEntry1] : Ms. DAMON is a 27 year old female who returns for endocrine evaluation. She presents with regard to a history of hypothyroidism first dx in October of 2016. She was first dx when anterior neck noted to be enlarged with TSH elevation. Pt denies previous US studies. Had been followed by Dr. Lam and was on LT4 75 mcg daily since first diagnosed with hypothyroidism. \par She had been off medication for several months prior to have bloodwork with us in May 2022. \par \par patient reports gaining weight of about 10 to 15 pounds in the last two to three months, she also reports ongoing fatigue. \par Sleeps well At times wakes up tired despite sleeping well.\par \par On OCP x 2 or more years-Jumel Fe 120\par \par She was recently in the ER 02/26/2023 with noted swelling of both legs and was identified as venous insufficiency currently following up with Vascular Dr. Lissy Gerard and cardio Cyrus Hdez. cardio did stress and echo and all levels normall. \par \par \par no longer taking lipitor 10 mg\par \par  she denies any palpitations, sob , tremors, anxiety, weight changes, temperature intolerance, severe fatigue, change in bowel habits, changes in mood. she also denies skin, hair, or nail changes.\par \par \par Menses have been regular on OCP, previously cycles lasted about 40-50 days. Taking Lo Loestrin OCP. \par She does admit to an irregular sleep pattern - she does go to sleep for about 2-3 hours, is awake for several hours, and then goes back to sleep. She does sleep a lot which has stayed constant since last visit. \par \par \par Additional medical history includes that of oral surgery 02/2021. \par FH: denies hx of thyroid disease. \par \par

## 2023-05-16 LAB
NICOTINAMIDE: 100.1 NG/ML
NICOTINIC ACID: <5 NG/ML

## 2023-05-17 LAB — VIT B2 SERPL-MCNC: 194 UG/L

## 2023-06-20 ENCOUNTER — APPOINTMENT (OUTPATIENT)
Dept: RHEUMATOLOGY | Facility: CLINIC | Age: 28
End: 2023-06-20

## 2023-06-20 ENCOUNTER — APPOINTMENT (OUTPATIENT)
Dept: FAMILY MEDICINE | Facility: CLINIC | Age: 28
End: 2023-06-20
Payer: COMMERCIAL

## 2023-06-20 VITALS
DIASTOLIC BLOOD PRESSURE: 68 MMHG | TEMPERATURE: 98 F | HEART RATE: 102 BPM | OXYGEN SATURATION: 98 % | BODY MASS INDEX: 27.82 KG/M2 | SYSTOLIC BLOOD PRESSURE: 118 MMHG | HEIGHT: 63 IN | RESPIRATION RATE: 15 BRPM | WEIGHT: 157 LBS

## 2023-06-20 PROCEDURE — 99213 OFFICE O/P EST LOW 20 MIN: CPT

## 2023-06-20 NOTE — PLAN
[FreeTextEntry1] : #Cough \par -trial of symptomatic treatment - benzonatate \par -trial of steroids \par -if symptoms do  not improve/worsen f/u Chest Xray \par -she will let me know if symptoms do not improve

## 2023-06-20 NOTE — ASSESSMENT
[FreeTextEntry1] : 28 yo F Vitamin D deficiency, Hashimoto's presenting today for cough.   
Awake/Alert
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
## 2023-06-20 NOTE — REVIEW OF SYSTEMS
[Sore Throat] : no sore throat [Shortness Of Breath] : no shortness of breath [Wheezing] : no wheezing [Cough] : cough [Negative] : Cardiovascular

## 2023-06-28 ENCOUNTER — RESULT REVIEW (OUTPATIENT)
Age: 28
End: 2023-06-28

## 2023-06-28 ENCOUNTER — OUTPATIENT (OUTPATIENT)
Dept: OUTPATIENT SERVICES | Facility: HOSPITAL | Age: 28
LOS: 1 days | End: 2023-06-28
Payer: COMMERCIAL

## 2023-06-28 ENCOUNTER — APPOINTMENT (OUTPATIENT)
Dept: RADIOLOGY | Facility: HOSPITAL | Age: 28
End: 2023-06-28
Payer: COMMERCIAL

## 2023-06-28 DIAGNOSIS — R05.9 COUGH, UNSPECIFIED: ICD-10-CM

## 2023-06-28 PROCEDURE — 71046 X-RAY EXAM CHEST 2 VIEWS: CPT | Mod: 26

## 2023-06-28 PROCEDURE — 71046 X-RAY EXAM CHEST 2 VIEWS: CPT

## 2023-07-31 ENCOUNTER — APPOINTMENT (OUTPATIENT)
Dept: SURGERY | Facility: CLINIC | Age: 28
End: 2023-07-31

## 2023-10-04 ENCOUNTER — TRANSCRIPTION ENCOUNTER (OUTPATIENT)
Age: 28
End: 2023-10-04

## 2023-10-09 ENCOUNTER — APPOINTMENT (OUTPATIENT)
Dept: FAMILY MEDICINE | Facility: CLINIC | Age: 28
End: 2023-10-09

## 2023-10-09 ENCOUNTER — APPOINTMENT (OUTPATIENT)
Dept: FAMILY MEDICINE | Facility: CLINIC | Age: 28
End: 2023-10-09
Payer: COMMERCIAL

## 2023-10-09 VITALS
HEART RATE: 99 BPM | TEMPERATURE: 97.9 F | HEIGHT: 63 IN | OXYGEN SATURATION: 96 % | DIASTOLIC BLOOD PRESSURE: 64 MMHG | SYSTOLIC BLOOD PRESSURE: 100 MMHG | RESPIRATION RATE: 12 BRPM | WEIGHT: 160 LBS | BODY MASS INDEX: 28.35 KG/M2

## 2023-10-09 DIAGNOSIS — J32.9 CHRONIC SINUSITIS, UNSPECIFIED: ICD-10-CM

## 2023-10-09 PROCEDURE — 99213 OFFICE O/P EST LOW 20 MIN: CPT

## 2023-10-09 RX ORDER — PREDNISONE 20 MG/1
20 TABLET ORAL
Qty: 10 | Refills: 0 | Status: COMPLETED | COMMUNITY
Start: 2023-06-20 | End: 2023-10-09

## 2023-10-09 RX ORDER — BUDESONIDE AND FORMOTEROL FUMARATE DIHYDRATE 80; 4.5 UG/1; UG/1
80-4.5 AEROSOL RESPIRATORY (INHALATION) TWICE DAILY
Qty: 1 | Refills: 0 | Status: COMPLETED | COMMUNITY
Start: 2023-06-28 | End: 2023-10-09

## 2023-10-09 RX ORDER — ALBUTEROL SULFATE 90 UG/1
108 (90 BASE) INHALANT RESPIRATORY (INHALATION)
Qty: 1 | Refills: 0 | Status: COMPLETED | COMMUNITY
Start: 2023-06-28 | End: 2023-10-09

## 2023-10-09 RX ORDER — MELOXICAM 7.5 MG/1
7.5 TABLET ORAL DAILY
Qty: 14 | Refills: 0 | Status: COMPLETED | COMMUNITY
Start: 2023-03-22 | End: 2023-10-09

## 2023-10-17 ENCOUNTER — LABORATORY RESULT (OUTPATIENT)
Age: 28
End: 2023-10-17

## 2023-10-17 ENCOUNTER — APPOINTMENT (OUTPATIENT)
Dept: PEDIATRIC ALLERGY IMMUNOLOGY | Facility: CLINIC | Age: 28
End: 2023-10-17
Payer: COMMERCIAL

## 2023-10-17 VITALS
OXYGEN SATURATION: 99 % | WEIGHT: 160 LBS | DIASTOLIC BLOOD PRESSURE: 70 MMHG | TEMPERATURE: 97.5 F | BODY MASS INDEX: 28.35 KG/M2 | SYSTOLIC BLOOD PRESSURE: 110 MMHG | HEIGHT: 63 IN | HEART RATE: 89 BPM

## 2023-10-17 DIAGNOSIS — J31.0 CHRONIC RHINITIS: ICD-10-CM

## 2023-10-17 DIAGNOSIS — J00 ACUTE NASOPHARYNGITIS [COMMON COLD]: ICD-10-CM

## 2023-10-17 PROCEDURE — 99204 OFFICE O/P NEW MOD 45 MIN: CPT

## 2023-10-18 LAB
A ALTERNATA IGE QN: <0.1 KUA/L
A FUMIGATUS IGE QN: <0.1 KUA/L
BERMUDA GRASS IGE QN: <0.1 KUA/L
BOXELDER IGE QN: <0.1 KUA/L
C HERBARUM IGE QN: <0.1 KUA/L
CALIF WALNUT IGE QN: <0.1 KUA/L
CAT DANDER IGE QN: <0.1 KUA/L
CMN PIGWEED IGE QN: <0.1 KUA/L
COMMON RAGWEED IGE QN: <0.1 KUA/L
COTTONWOOD IGE QN: <0.1 KUA/L
D FARINAE IGE QN: <0.1 KUA/L
D PTERONYSS IGE QN: <0.1 KUA/L
DEPRECATED A ALTERNATA IGE RAST QL: 0
DEPRECATED A FUMIGATUS IGE RAST QL: 0
DEPRECATED BERMUDA GRASS IGE RAST QL: 0
DEPRECATED BOXELDER IGE RAST QL: 0
DEPRECATED C HERBARUM IGE RAST QL: 0
DEPRECATED CAT DANDER IGE RAST QL: 0
DEPRECATED COMMON PIGWEED IGE RAST QL: 0
DEPRECATED COMMON RAGWEED IGE RAST QL: 0
DEPRECATED COTTONWOOD IGE RAST QL: 0
DEPRECATED D FARINAE IGE RAST QL: 0
DEPRECATED D PTERONYSS IGE RAST QL: 0
DEPRECATED DOG DANDER IGE RAST QL: 0
DEPRECATED GOOSEFOOT IGE RAST QL: 0
DEPRECATED KAPPA LC FREE/LAMBDA SER: 1.82 RATIO
DEPRECATED LONDON PLANE IGE RAST QL: 0
DEPRECATED MOUSE URINE PROT IGE RAST QL: 0
DEPRECATED MUGWORT IGE RAST QL: 0
DEPRECATED P NOTATUM IGE RAST QL: 0
DEPRECATED RED CEDAR IGE RAST QL: 0
DEPRECATED ROACH IGE RAST QL: 0
DEPRECATED SHEEP SORREL IGE RAST QL: 0
DEPRECATED SILVER BIRCH IGE RAST QL: 0
DEPRECATED TIMOTHY IGE RAST QL: 0
DEPRECATED WHITE ASH IGE RAST QL: 0
DEPRECATED WHITE OAK IGE RAST QL: 0
DOG DANDER IGE QN: <0.1 KUA/L
GOOSEFOOT IGE QN: <0.1 KUA/L
IGA SER QL IEP: 213 MG/DL
IGG SER QL IEP: 1331 MG/DL
IGM SER QL IEP: 106 MG/DL
KAPPA LC CSF-MCNC: 0.92 MG/DL
KAPPA LC SERPL-MCNC: 1.67 MG/DL
LONDON PLANE IGE QN: <0.1 KUA/L
MOUSE URINE PROT IGE QN: <0.1 KUA/L
MUGWORT IGE QN: <0.1 KUA/L
MULBERRY (T70) CLASS: 0
MULBERRY (T70) CONC: <0.1 KUA/L
P NOTATUM IGE QN: <0.1 KUA/L
RED CEDAR IGE QN: <0.1 KUA/L
ROACH IGE QN: <0.1 KUA/L
SHEEP SORREL IGE QN: <0.1 KUA/L
SILVER BIRCH IGE QN: <0.1 KUA/L
TIMOTHY IGE QN: <0.1 KUA/L
TREE ALLERG MIX1 IGE QL: 0
WHITE ASH IGE QN: <0.1 KUA/L
WHITE ELM IGE QN: 0
WHITE ELM IGE QN: <0.1 KUA/L
WHITE OAK IGE QN: <0.1 KUA/L

## 2023-11-25 ENCOUNTER — NON-APPOINTMENT (OUTPATIENT)
Age: 28
End: 2023-11-25

## 2023-12-19 ENCOUNTER — APPOINTMENT (OUTPATIENT)
Dept: FAMILY MEDICINE | Facility: CLINIC | Age: 28
End: 2023-12-19
Payer: COMMERCIAL

## 2023-12-19 VITALS
RESPIRATION RATE: 12 BRPM | HEART RATE: 95 BPM | DIASTOLIC BLOOD PRESSURE: 70 MMHG | SYSTOLIC BLOOD PRESSURE: 103 MMHG | WEIGHT: 157 LBS | TEMPERATURE: 97.9 F | BODY MASS INDEX: 27.82 KG/M2 | OXYGEN SATURATION: 98 % | HEIGHT: 63 IN

## 2023-12-19 DIAGNOSIS — Z78.9 OTHER SPECIFIED HEALTH STATUS: ICD-10-CM

## 2023-12-19 DIAGNOSIS — L05.01 PILONIDAL CYST WITH ABSCESS: ICD-10-CM

## 2023-12-19 DIAGNOSIS — Z00.00 ENCOUNTER FOR GENERAL ADULT MEDICAL EXAMINATION W/OUT ABNORMAL FINDINGS: ICD-10-CM

## 2023-12-19 DIAGNOSIS — E03.9 HYPOTHYROIDISM, UNSPECIFIED: ICD-10-CM

## 2023-12-19 PROCEDURE — 99395 PREV VISIT EST AGE 18-39: CPT

## 2023-12-19 RX ORDER — AMOXICILLIN AND CLAVULANATE POTASSIUM 875; 125 MG/1; MG/1
875-125 TABLET, COATED ORAL
Qty: 14 | Refills: 0 | Status: COMPLETED | COMMUNITY
Start: 2023-10-09 | End: 2023-12-19

## 2023-12-19 RX ORDER — BENZONATATE 100 MG/1
100 CAPSULE ORAL TWICE DAILY
Qty: 28 | Refills: 0 | Status: DISCONTINUED | COMMUNITY
Start: 2023-06-20 | End: 2023-12-19

## 2023-12-19 RX ORDER — BENZONATATE 100 MG/1
100 CAPSULE ORAL TWICE DAILY
Qty: 28 | Refills: 0 | Status: COMPLETED | COMMUNITY
Start: 2023-10-09 | End: 2023-12-19

## 2023-12-19 RX ORDER — NORETHINDRONE ACETATE AND ETHINYL ESTRADIOL 1; 20 MG/1; UG/1
1-20 TABLET ORAL
Refills: 0 | Status: DISCONTINUED | COMMUNITY
Start: 2023-03-07 | End: 2023-12-19

## 2023-12-19 NOTE — PHYSICAL EXAM
[Normal Sclera/Conjunctiva] : normal sclera/conjunctiva [PERRL] : pupils equal round and reactive to light [EOMI] : extraocular movements intact [No JVD] : no jugular venous distention [No Lymphadenopathy] : no lymphadenopathy [Supple] : supple [Normal] : normal rate, regular rhythm, normal S1 and S2 and no murmur heard [No Edema] : there was no peripheral edema [No Extremity Clubbing/Cyanosis] : no extremity clubbing/cyanosis [Soft] : abdomen soft [Non Tender] : non-tender [Non-distended] : non-distended [No Masses] : no abdominal mass palpated [Normal Supraclavicular Nodes] : no supraclavicular lymphadenopathy [Normal Anterior Cervical Nodes] : no anterior cervical lymphadenopathy [Normal Gait] : normal gait [Normal Affect] : the affect was normal [Alert and Oriented x3] : oriented to person, place, and time [Normal Mood] : the mood was normal [Normal Insight/Judgement] : insight and judgment were intact [de-identified] : R tonsil slightly larger than L, no exudates or erythema

## 2023-12-19 NOTE — HISTORY OF PRESENT ILLNESS
[FreeTextEntry1] : needs cpe [de-identified] : Pt comes in for CPE. Feeling well.   Off OCPs, was trying to switch to Nexplanon.

## 2023-12-19 NOTE — ASSESSMENT
[FreeTextEntry1] : # HM f/u AV labs Up to date w/ cervical ca screening Refuses flu vaccine  # Hypothyroid following w/ endo  f/u in 1 year or PRN

## 2023-12-19 NOTE — HEALTH RISK ASSESSMENT
[No] : In the past 12 months have you used drugs other than those required for medical reasons? No [0] : 2) Feeling down, depressed, or hopeless: Not at all (0) [PHQ-2 Negative - No further assessment needed] : PHQ-2 Negative - No further assessment needed [Never] : Never [de-identified] : not since February 2023 [de-identified] : Golfs, plays tennis, does pilates [de-identified] : Doesn't eat fast food, eats chicken/turkey, loves vegetables. Does drink soda. [[id number]] : 0 [PapSmearDate] : 12/23

## 2023-12-20 ENCOUNTER — LABORATORY RESULT (OUTPATIENT)
Age: 28
End: 2023-12-20

## 2023-12-21 ENCOUNTER — TRANSCRIPTION ENCOUNTER (OUTPATIENT)
Age: 28
End: 2023-12-21

## 2023-12-21 LAB
ALBUMIN SERPL ELPH-MCNC: 4.8 G/DL
ALP BLD-CCNC: 82 U/L
ALT SERPL-CCNC: 24 U/L
ANION GAP SERPL CALC-SCNC: 12 MMOL/L
APPEARANCE: ABNORMAL
AST SERPL-CCNC: 13 U/L
BASOPHILS # BLD AUTO: 0.04 K/UL
BASOPHILS NFR BLD AUTO: 0.8 %
BILIRUB SERPL-MCNC: 0.7 MG/DL
BILIRUBIN URINE: NEGATIVE
BLOOD URINE: NEGATIVE
BUN SERPL-MCNC: 15 MG/DL
CALCIUM SERPL-MCNC: 9.5 MG/DL
CHLORIDE SERPL-SCNC: 102 MMOL/L
CHOLEST SERPL-MCNC: 208 MG/DL
CO2 SERPL-SCNC: 24 MMOL/L
COLOR: YELLOW
CREAT SERPL-MCNC: 0.58 MG/DL
EGFR: 127 ML/MIN/1.73M2
EOSINOPHIL # BLD AUTO: 0.15 K/UL
EOSINOPHIL NFR BLD AUTO: 2.9 %
ESTIMATED AVERAGE GLUCOSE: 97 MG/DL
GLUCOSE QUALITATIVE U: NEGATIVE MG/DL
GLUCOSE SERPL-MCNC: 90 MG/DL
HBA1C MFR BLD HPLC: 5 %
HCT VFR BLD CALC: 39.3 %
HDLC SERPL-MCNC: 46 MG/DL
HGB BLD-MCNC: 13 G/DL
IMM GRANULOCYTES NFR BLD AUTO: 0.8 %
KETONES URINE: NEGATIVE MG/DL
LDLC SERPL CALC-MCNC: 144 MG/DL
LEUKOCYTE ESTERASE URINE: NEGATIVE
LYMPHOCYTES # BLD AUTO: 1.38 K/UL
LYMPHOCYTES NFR BLD AUTO: 26.4 %
MAN DIFF?: NORMAL
MCHC RBC-ENTMCNC: 29.4 PG
MCHC RBC-ENTMCNC: 33.1 GM/DL
MCV RBC AUTO: 88.9 FL
MONOCYTES # BLD AUTO: 0.34 K/UL
MONOCYTES NFR BLD AUTO: 6.5 %
NEUTROPHILS # BLD AUTO: 3.27 K/UL
NEUTROPHILS NFR BLD AUTO: 62.6 %
NITRITE URINE: NEGATIVE
NONHDLC SERPL-MCNC: 162 MG/DL
PH URINE: 5.5
PLATELET # BLD AUTO: 315 K/UL
POTASSIUM SERPL-SCNC: 4.4 MMOL/L
PROT SERPL-MCNC: 7.8 G/DL
PROTEIN URINE: NEGATIVE MG/DL
RBC # BLD: 4.42 M/UL
RBC # FLD: 12.4 %
SODIUM SERPL-SCNC: 138 MMOL/L
SPECIFIC GRAVITY URINE: 1.02
TRIGL SERPL-MCNC: 96 MG/DL
UROBILINOGEN URINE: 0.2 MG/DL
WBC # FLD AUTO: 5.22 K/UL

## 2024-01-04 ENCOUNTER — APPOINTMENT (RX ONLY)
Dept: URBAN - METROPOLITAN AREA CLINIC 54 | Facility: CLINIC | Age: 29
Setting detail: DERMATOLOGY
End: 2024-01-04

## 2024-01-04 DIAGNOSIS — L70.0 ACNE VULGARIS: ICD-10-CM

## 2024-01-04 PROCEDURE — ? ADDITIONAL NOTES

## 2024-01-04 PROCEDURE — 99203 OFFICE O/P NEW LOW 30 MIN: CPT | Mod: 95

## 2024-01-04 PROCEDURE — ? PRESCRIPTION

## 2024-01-04 PROCEDURE — ? COUNSELING

## 2024-01-04 RX ORDER — SPIRONOLACTONE 50 MG/1
TABLET, FILM COATED ORAL QD
Qty: 30 | Refills: 4 | Status: ERX | COMMUNITY
Start: 2024-01-04

## 2024-01-04 RX ADMIN — SPIRONOLACTONE: 50 TABLET, FILM COATED ORAL at 00:00

## 2024-01-04 NOTE — PROCEDURE: COUNSELING
Bactrim Pregnancy And Lactation Text: This medication is Pregnancy Category D and is known to cause fetal risk.  It is also excreted in breast milk.
Topical Sulfur Applications Pregnancy And Lactation Text: This medication is Pregnancy Category C and has an unknown safety profile during pregnancy. It is unknown if this topical medication is excreted in breast milk.
Erythromycin Pregnancy And Lactation Text: This medication is Pregnancy Category B and is considered safe during pregnancy. It is also excreted in breast milk.
Birth Control Pills Pregnancy And Lactation Text: This medication should be avoided if pregnant and for the first 30 days post-partum.
Winlevi Pregnancy And Lactation Text: This medication is considered safe during pregnancy and breastfeeding.
Detail Level: Zone
Azelaic Acid Counseling: Patient counseled that medicine may cause skin irritation and to avoid applying near the eyes.  In the event of skin irritation, the patient was advised to reduce the amount of the drug applied or use it less frequently.   The patient verbalized understanding of the proper use and possible adverse effects of azelaic acid.  All of the patient's questions and concerns were addressed.
Isotretinoin Counseling: Patient should get monthly blood tests, not donate blood, not drive at night if vision affected, not share medication, and not undergo elective surgery for 6 months after tx completed. Side effects reviewed, pt to contact office should one occur.
Topical Retinoid counseling:  Patient advised to apply a pea-sized amount only at bedtime and wait 30 minutes after washing their face before applying.  If too drying, patient may add a non-comedogenic moisturizer. The patient verbalized understanding of the proper use and possible adverse effects of retinoids.  All of the patient's questions and concerns were addressed.
Benzoyl Peroxide Counseling: Patient counseled that medicine may cause skin irritation and bleach clothing.  In the event of skin irritation, the patient was advised to reduce the amount of the drug applied or use it less frequently.   The patient verbalized understanding of the proper use and possible adverse effects of benzoyl peroxide.  All of the patient's questions and concerns were addressed.
Spironolactone Counseling: Patient advised regarding risks of diarrhea, abdominal pain, hyperkalemia, birth defects (for female patients), liver toxicity and renal toxicity. The patient may need blood work to monitor liver and kidney function and potassium levels while on therapy. The patient verbalized understanding of the proper use and possible adverse effects of spironolactone.  All of the patient's questions and concerns were addressed.
Tazorac Counseling:  Patient advised that medication is irritating and drying.  Patient may need to apply sparingly and wash off after an hour before eventually leaving it on overnight.  The patient verbalized understanding of the proper use and possible adverse effects of tazorac.  All of the patient's questions and concerns were addressed.
High Dose Vitamin A Counseling: Side effects reviewed, pt to contact office should one occur.
Aklief counseling:  Patient advised to apply a pea-sized amount only at bedtime and wait 30 minutes after washing their face before applying.  If too drying, patient may add a non-comedogenic moisturizer.  The most commonly reported side effects including irritation, redness, scaling, dryness, stinging, burning, itching, and increased risk of sunburn.  The patient verbalized understanding of the proper use and possible adverse effects of retinoids.  All of the patient's questions and concerns were addressed.
Minocycline Counseling: Patient advised regarding possible photosensitivity and discoloration of the teeth, skin, lips, tongue and gums.  Patient instructed to avoid sunlight, if possible.  When exposed to sunlight, patients should wear protective clothing, sunglasses, and sunscreen.  The patient was instructed to call the office immediately if the following severe adverse effects occur:  hearing changes, easy bruising/bleeding, severe headache, or vision changes.  The patient verbalized understanding of the proper use and possible adverse effects of minocycline.  All of the patient's questions and concerns were addressed.
Tetracycline Counseling: Patient counseled regarding possible photosensitivity and increased risk for sunburn.  Patient instructed to avoid sunlight, if possible.  When exposed to sunlight, patients should wear protective clothing, sunglasses, and sunscreen.  The patient was instructed to call the office immediately if the following severe adverse effects occur:  hearing changes, easy bruising/bleeding, severe headache, or vision changes.  The patient verbalized understanding of the proper use and possible adverse effects of tetracycline.  All of the patient's questions and concerns were addressed. Patient understands to avoid pregnancy while on therapy due to potential birth defects.
Sarecycline Counseling: Patient advised regarding possible photosensitivity and discoloration of the teeth, skin, lips, tongue and gums.  Patient instructed to avoid sunlight, if possible.  When exposed to sunlight, patients should wear protective clothing, sunglasses, and sunscreen.  The patient was instructed to call the office immediately if the following severe adverse effects occur:  hearing changes, easy bruising/bleeding, severe headache, or vision changes.  The patient verbalized understanding of the proper use and possible adverse effects of sarecycline.  All of the patient's questions and concerns were addressed.
Azithromycin Counseling:  I discussed with the patient the risks of azithromycin including but not limited to GI upset, allergic reaction, drug rash, diarrhea, and yeast infections.
Doxycycline Counseling:  Patient counseled regarding possible photosensitivity and increased risk for sunburn.  Patient instructed to avoid sunlight, if possible.  When exposed to sunlight, patients should wear protective clothing, sunglasses, and sunscreen.  The patient was instructed to call the office immediately if the following severe adverse effects occur:  hearing changes, easy bruising/bleeding, severe headache, or vision changes.  The patient verbalized understanding of the proper use and possible adverse effects of doxycycline.  All of the patient's questions and concerns were addressed.
Use Enhanced Medication Counseling?: No
Bactrim Counseling:  I discussed with the patient the risks of sulfa antibiotics including but not limited to GI upset, allergic reaction, drug rash, diarrhea, dizziness, photosensitivity, and yeast infections.  Rarely, more serious reactions can occur including but not limited to aplastic anemia, agranulocytosis, methemoglobinemia, blood dyscrasias, liver or kidney failure, lung infiltrates or desquamative/blistering drug rashes.
Erythromycin Counseling:  I discussed with the patient the risks of erythromycin including but not limited to GI upset, allergic reaction, drug rash, diarrhea, increase in liver enzymes, and yeast infections.
Topical Clindamycin Counseling: Patient counseled that this medication may cause skin irritation or allergic reactions.  In the event of skin irritation, the patient was advised to reduce the amount of the drug applied or use it less frequently.   The patient verbalized understanding of the proper use and possible adverse effects of clindamycin.  All of the patient's questions and concerns were addressed.
Winlevi Counseling:  I discussed with the patient the risks of topical clascoterone including but not limited to erythema, scaling, itching, and stinging. Patient voiced their understanding.
Topical Sulfur Applications Counseling: Topical Sulfur Counseling: Patient counseled that this medication may cause skin irritation or allergic reactions.  In the event of skin irritation, the patient was advised to reduce the amount of the drug applied or use it less frequently.   The patient verbalized understanding of the proper use and possible adverse effects of topical sulfur application.  All of the patient's questions and concerns were addressed.
Birth Control Pills Counseling: Birth Control Pill Counseling: I discussed with the patient the potential side effects of OCPs including but not limited to increased risk of stroke, heart attack, thrombophlebitis, deep venous thrombosis, hepatic adenomas, breast changes, GI upset, headaches, and depression.  The patient verbalized understanding of the proper use and possible adverse effects of OCPs. All of the patient's questions and concerns were addressed.
Dapsone Counseling: I discussed with the patient the risks of dapsone including but not limited to hemolytic anemia, agranulocytosis, rashes, methemoglobinemia, kidney failure, peripheral neuropathy, headaches, GI upset, and liver toxicity.  Patients who start dapsone require monitoring including baseline LFTs and weekly CBCs for the first month, then every month thereafter.  The patient verbalized understanding of the proper use and possible adverse effects of dapsone.  All of the patient's questions and concerns were addressed.
Azelaic Acid Pregnancy And Lactation Text: This medication is considered safe during pregnancy and breast feeding.
Sarecycline Pregnancy And Lactation Text: This medication is Pregnancy Category D and not consider safe during pregnancy. It is also excreted in breast milk.
Aklief Pregnancy And Lactation Text: It is unknown if this medication is safe to use during pregnancy.  It is unknown if this medication is excreted in breast milk.  Breastfeeding women should use the topical cream on the smallest area of the skin for the shortest time needed while breastfeeding.  Do not apply to nipple and areola.
Spironolactone Pregnancy And Lactation Text: This medication can cause feminization of the male fetus and should be avoided during pregnancy. The active metabolite is also found in breast milk.
Benzoyl Peroxide Pregnancy And Lactation Text: This medication is Pregnancy Category C. It is unknown if benzoyl peroxide is excreted in breast milk.
Isotretinoin Pregnancy And Lactation Text: This medication is Pregnancy Category X and is considered extremely dangerous during pregnancy. It is unknown if it is excreted in breast milk.
Dapsone Pregnancy And Lactation Text: This medication is Pregnancy Category C and is not considered safe during pregnancy or breast feeding.
High Dose Vitamin A Pregnancy And Lactation Text: High dose vitamin A therapy is contraindicated during pregnancy and breast feeding.
Topical Retinoid Pregnancy And Lactation Text: This medication is Pregnancy Category C. It is unknown if this medication is excreted in breast milk.
Doxycycline Pregnancy And Lactation Text: This medication is Pregnancy Category D and not consider safe during pregnancy. It is also excreted in breast milk but is considered safe for shorter treatment courses.
Topical Clindamycin Pregnancy And Lactation Text: This medication is Pregnancy Category B and is considered safe during pregnancy. It is unknown if it is excreted in breast milk.
Tazorac Pregnancy And Lactation Text: This medication is not safe during pregnancy. It is unknown if this medication is excreted in breast milk.
Azithromycin Pregnancy And Lactation Text: This medication is considered safe during pregnancy and is also secreted in breast milk.

## 2024-03-21 ENCOUNTER — TRANSCRIPTION ENCOUNTER (OUTPATIENT)
Age: 29
End: 2024-03-21

## 2024-03-21 DIAGNOSIS — L65.9 NONSCARRING HAIR LOSS, UNSPECIFIED: ICD-10-CM

## 2024-04-05 ENCOUNTER — TRANSCRIPTION ENCOUNTER (OUTPATIENT)
Age: 29
End: 2024-04-05

## 2024-04-09 ENCOUNTER — NON-APPOINTMENT (OUTPATIENT)
Age: 29
End: 2024-04-09

## 2024-04-10 ENCOUNTER — TRANSCRIPTION ENCOUNTER (OUTPATIENT)
Age: 29
End: 2024-04-10

## 2024-04-10 LAB
25(OH)D3 SERPL-MCNC: 24.4 NG/ML
BASOPHILS # BLD AUTO: 0.05 K/UL
BASOPHILS NFR BLD AUTO: 0.9 %
EOSINOPHIL # BLD AUTO: 0.12 K/UL
EOSINOPHIL NFR BLD AUTO: 2.1 %
FERRITIN SERPL-MCNC: 88 NG/ML
FOLATE SERPL-MCNC: 12.8 NG/ML
HCT VFR BLD CALC: 39.9 %
HGB BLD-MCNC: 13.3 G/DL
IMM GRANULOCYTES NFR BLD AUTO: 0.5 %
IRON SATN MFR SERPL: 34 %
IRON SERPL-MCNC: 121 UG/DL
LYMPHOCYTES # BLD AUTO: 1.35 K/UL
LYMPHOCYTES NFR BLD AUTO: 23.3 %
MAN DIFF?: NORMAL
MCHC RBC-ENTMCNC: 30 PG
MCHC RBC-ENTMCNC: 33.3 GM/DL
MCV RBC AUTO: 90.1 FL
MONOCYTES # BLD AUTO: 0.27 K/UL
MONOCYTES NFR BLD AUTO: 4.7 %
NEUTROPHILS # BLD AUTO: 3.98 K/UL
NEUTROPHILS NFR BLD AUTO: 68.5 %
PLATELET # BLD AUTO: 337 K/UL
RBC # BLD: 4.36 M/UL
RBC # BLD: 4.43 M/UL
RBC # FLD: 13 %
RETICS # AUTO: 1.6 %
RETICS AGGREG/RBC NFR: 69.8 K/UL
T3 SERPL-MCNC: 110 NG/DL
T4 FREE SERPL-MCNC: 1 NG/DL
TIBC SERPL-MCNC: 352 UG/DL
TSH SERPL-ACNC: 1.75 UIU/ML
UIBC SERPL-MCNC: 231 UG/DL
VIT B12 SERPL-MCNC: 601 PG/ML
WBC # FLD AUTO: 5.8 K/UL

## 2024-04-12 ENCOUNTER — TRANSCRIPTION ENCOUNTER (OUTPATIENT)
Age: 29
End: 2024-04-12

## 2024-04-15 ENCOUNTER — APPOINTMENT (OUTPATIENT)
Dept: GASTROENTEROLOGY | Facility: CLINIC | Age: 29
End: 2024-04-15
Payer: COMMERCIAL

## 2024-04-15 VITALS
SYSTOLIC BLOOD PRESSURE: 123 MMHG | BODY MASS INDEX: 28.53 KG/M2 | HEIGHT: 63 IN | DIASTOLIC BLOOD PRESSURE: 77 MMHG | WEIGHT: 161 LBS | OXYGEN SATURATION: 100 % | HEART RATE: 99 BPM | TEMPERATURE: 97.9 F

## 2024-04-15 DIAGNOSIS — R10.9 UNSPECIFIED ABDOMINAL PAIN: ICD-10-CM

## 2024-04-15 DIAGNOSIS — K59.09 OTHER CONSTIPATION: ICD-10-CM

## 2024-04-15 PROCEDURE — 99204 OFFICE O/P NEW MOD 45 MIN: CPT

## 2024-04-15 RX ORDER — SODIUM PICOSULFATE, MAGNESIUM OXIDE, AND ANHYDROUS CITRIC ACID 12; 3.5; 1 G/175ML; G/175ML; MG/175ML
10-3.5-12 MG-GM LIQUID ORAL
Qty: 2 | Refills: 0 | Status: ACTIVE | COMMUNITY
Start: 2024-04-15 | End: 1900-01-01

## 2024-04-15 NOTE — ASSESSMENT
[FreeTextEntry1] : 1. chronic constipation, now with abdominal pain, recommend colonoscopy to r/o colon lesion, colitis etc.  Discussed risks including but not limited to bleeding,infection,drug reaction, perforation,missed lesion,benefits and alternatives of colonoscopy/egd with patient  including no treatment and patient consents to procedure.  plan colonoscopy to be scheduled linzess daily  2.frequent bms with certain food, mother with h/o celiac disease,ddx, gerd, food intolerance, celiac disease recommend egd to r/o gastritis, sprue etc.  plan check celiac markers today egd to be scheduled

## 2024-04-15 NOTE — HISTORY OF PRESENT ILLNESS
[FreeTextEntry1] : 27 yo female  with family h/o colon polyps, father and patient reports  constipation , one bm every 4 days intermittent for over few years. Patient report probiotic helps the pain .Certain foods makes patient have frequent bm and abdominal paon. h/o anxiety. no brbpr, no melena. no weight loss. rare nausea, no vomiting, no gerd.  no family h/o colon cancer, h/o colon polyps

## 2024-04-15 NOTE — REVIEW OF SYSTEMS
[As Noted in HPI] : as noted in HPI [Testicular Pain] : testicular pain [Fever] : no fever [Feeling Poorly] : not feeling poorly [Red Eyes] : eyes not red [Chest Pain] : no chest pain [SOB on Exertion] : no shortness of breath during exertion [Joint Stiffness] : no joint stiffness [Skin Wound] : no skin wound [Dizziness] : no dizziness [Anxiety] : no anxiety [Muscle Weakness] : no muscle weakness [Easy Bruising] : no tendency for easy bruising

## 2024-04-15 NOTE — PHYSICAL EXAM
[Alert] : alert [Healthy Appearing] : healthy appearing [Hearing Threshold Finger Rub Not Crane] : hearing was normal [No Respiratory Distress] : no respiratory distress [Auscultation Breath Sounds / Voice Sounds] : lungs were clear to auscultation bilaterally [Normal S1, S2] : normal S1 and S2 [Bowel Sounds] : normal bowel sounds [Abdomen Tenderness] : non-tender [Abdomen Soft] : soft [No CVA Tenderness] : no CVA  tenderness [Abnormal Walk] : normal gait [] : no rash [No Focal Deficits] : no focal deficits [Oriented To Time, Place, And Person] : oriented to person, place, and time

## 2024-04-17 LAB
ENDOMYSIUM IGA SER QL: NEGATIVE
ENDOMYSIUM IGA TITR SER: NORMAL
GLIADIN IGA SER QL: 5.2 UNITS
GLIADIN IGG SER QL: <5 UNITS
GLIADIN PEPTIDE IGA SER-ACNC: NEGATIVE
GLIADIN PEPTIDE IGG SER-ACNC: NEGATIVE
IGA SER QL IEP: 210 MG/DL
TTG IGA SER IA-ACNC: 1.8 U/ML
TTG IGA SER-ACNC: NEGATIVE
TTG IGG SER IA-ACNC: 2.2 U/ML
TTG IGG SER IA-ACNC: NEGATIVE

## 2024-04-29 RX ORDER — LINACLOTIDE 145 UG/1
145 CAPSULE, GELATIN COATED ORAL
Qty: 30 | Refills: 3 | Status: ACTIVE | COMMUNITY
Start: 2024-04-15

## 2024-04-30 RX ORDER — LUBIPROSTONE 24 UG/1
24 CAPSULE ORAL
Qty: 60 | Refills: 3 | Status: ACTIVE | COMMUNITY
Start: 2024-04-29

## 2024-06-27 ENCOUNTER — APPOINTMENT (OUTPATIENT)
Dept: GASTROENTEROLOGY | Facility: CLINIC | Age: 29
End: 2024-06-27
Payer: COMMERCIAL

## 2024-06-27 ENCOUNTER — LABORATORY RESULT (OUTPATIENT)
Age: 29
End: 2024-06-27

## 2024-06-27 PROCEDURE — 45380 COLONOSCOPY AND BIOPSY: CPT

## 2024-06-27 PROCEDURE — 43239 EGD BIOPSY SINGLE/MULTIPLE: CPT | Mod: 33

## 2024-11-01 ENCOUNTER — TRANSCRIPTION ENCOUNTER (OUTPATIENT)
Age: 29
End: 2024-11-01

## 2024-12-31 ENCOUNTER — APPOINTMENT (OUTPATIENT)
Dept: FAMILY MEDICINE | Facility: CLINIC | Age: 29
End: 2024-12-31
Payer: COMMERCIAL

## 2024-12-31 VITALS
TEMPERATURE: 97.5 F | HEART RATE: 104 BPM | WEIGHT: 174 LBS | SYSTOLIC BLOOD PRESSURE: 116 MMHG | BODY MASS INDEX: 30.83 KG/M2 | OXYGEN SATURATION: 98 % | RESPIRATION RATE: 12 BRPM | HEIGHT: 63 IN | DIASTOLIC BLOOD PRESSURE: 80 MMHG

## 2024-12-31 DIAGNOSIS — Z13.220 ENCOUNTER FOR SCREENING FOR LIPOID DISORDERS: ICD-10-CM

## 2024-12-31 DIAGNOSIS — Z13.1 ENCOUNTER FOR SCREENING FOR DIABETES MELLITUS: ICD-10-CM

## 2024-12-31 DIAGNOSIS — Z00.00 ENCOUNTER FOR GENERAL ADULT MEDICAL EXAMINATION W/OUT ABNORMAL FINDINGS: ICD-10-CM

## 2024-12-31 DIAGNOSIS — E55.9 VITAMIN D DEFICIENCY, UNSPECIFIED: ICD-10-CM

## 2024-12-31 PROCEDURE — 99395 PREV VISIT EST AGE 18-39: CPT

## 2024-12-31 RX ORDER — ETONOGESTREL 68 MG/1
IMPLANT SUBCUTANEOUS
Refills: 0 | Status: ACTIVE | COMMUNITY

## 2025-01-06 DIAGNOSIS — R63.5 ABNORMAL WEIGHT GAIN: ICD-10-CM

## 2025-01-06 LAB
25(OH)D3 SERPL-MCNC: 23.3 NG/ML
ALBUMIN SERPL ELPH-MCNC: 4.5 G/DL
ALP BLD-CCNC: 81 U/L
ALT SERPL-CCNC: 16 U/L
ANION GAP SERPL CALC-SCNC: 10 MMOL/L
AST SERPL-CCNC: 13 U/L
BASOPHILS # BLD AUTO: 0.06 K/UL
BASOPHILS NFR BLD AUTO: 0.8 %
BILIRUB SERPL-MCNC: 0.4 MG/DL
BUN SERPL-MCNC: 21 MG/DL
CALCIUM SERPL-MCNC: 9.6 MG/DL
CHLORIDE SERPL-SCNC: 102 MMOL/L
CHOLEST SERPL-MCNC: 188 MG/DL
CK SERPL-CCNC: 44 U/L
CO2 SERPL-SCNC: 24 MMOL/L
CREAT SERPL-MCNC: 0.57 MG/DL
EGFR: 127 ML/MIN/1.73M2
EOSINOPHIL # BLD AUTO: 0.17 K/UL
EOSINOPHIL NFR BLD AUTO: 2.2 %
ERYTHROCYTE [SEDIMENTATION RATE] IN BLOOD BY WESTERGREN METHOD: 29 MM/HR
ESTIMATED AVERAGE GLUCOSE: 100 MG/DL
GLUCOSE SERPL-MCNC: 97 MG/DL
HBA1C MFR BLD HPLC: 5.1 %
HCT VFR BLD CALC: 38.3 %
HDLC SERPL-MCNC: 42 MG/DL
HGB BLD-MCNC: 13 G/DL
IMM GRANULOCYTES NFR BLD AUTO: 0.6 %
LDLC SERPL CALC-MCNC: 134 MG/DL
LYMPHOCYTES # BLD AUTO: 1.6 K/UL
LYMPHOCYTES NFR BLD AUTO: 20.7 %
MAN DIFF?: NORMAL
MCHC RBC-ENTMCNC: 29.7 PG
MCHC RBC-ENTMCNC: 33.9 G/DL
MCV RBC AUTO: 87.4 FL
MONOCYTES # BLD AUTO: 0.4 K/UL
MONOCYTES NFR BLD AUTO: 5.2 %
NEUTROPHILS # BLD AUTO: 5.45 K/UL
NEUTROPHILS NFR BLD AUTO: 70.5 %
NONHDLC SERPL-MCNC: 146 MG/DL
PLATELET # BLD AUTO: 298 K/UL
POTASSIUM SERPL-SCNC: 4.3 MMOL/L
PROT SERPL-MCNC: 7.4 G/DL
RBC # BLD: 4.38 M/UL
RBC # FLD: 12.1 %
SODIUM SERPL-SCNC: 136 MMOL/L
T3 SERPL-MCNC: 126 NG/DL
T4 FREE SERPL-MCNC: 1.1 NG/DL
TRIGL SERPL-MCNC: 63 MG/DL
TSH SERPL-ACNC: 1.3 UIU/ML
WBC # FLD AUTO: 7.73 K/UL

## 2025-01-28 ENCOUNTER — NON-APPOINTMENT (OUTPATIENT)
Age: 30
End: 2025-01-28

## 2025-02-24 ENCOUNTER — APPOINTMENT (OUTPATIENT)
Dept: FAMILY MEDICINE | Facility: CLINIC | Age: 30
End: 2025-02-24
Payer: MEDICAID

## 2025-02-24 ENCOUNTER — NON-APPOINTMENT (OUTPATIENT)
Age: 30
End: 2025-02-24

## 2025-02-24 VITALS
RESPIRATION RATE: 14 BRPM | HEART RATE: 138 BPM | OXYGEN SATURATION: 98 % | SYSTOLIC BLOOD PRESSURE: 110 MMHG | TEMPERATURE: 97.7 F | HEIGHT: 63 IN | WEIGHT: 160 LBS | BODY MASS INDEX: 28.35 KG/M2 | DIASTOLIC BLOOD PRESSURE: 60 MMHG

## 2025-02-24 DIAGNOSIS — R00.0 TACHYCARDIA, UNSPECIFIED: ICD-10-CM

## 2025-02-24 DIAGNOSIS — L02.91 CUTANEOUS ABSCESS, UNSPECIFIED: ICD-10-CM

## 2025-02-24 DIAGNOSIS — U09.9 POST COVID-19 CONDITION, UNSPECIFIED: ICD-10-CM

## 2025-02-24 DIAGNOSIS — R53.83 OTHER FATIGUE: ICD-10-CM

## 2025-02-24 PROCEDURE — 99214 OFFICE O/P EST MOD 30 MIN: CPT

## 2025-02-26 ENCOUNTER — APPOINTMENT (OUTPATIENT)
Dept: CARDIOLOGY | Facility: CLINIC | Age: 30
End: 2025-02-26
Payer: MEDICAID

## 2025-02-26 ENCOUNTER — NON-APPOINTMENT (OUTPATIENT)
Age: 30
End: 2025-02-26

## 2025-02-26 VITALS — WEIGHT: 162 LBS | HEIGHT: 63 IN | HEART RATE: 127 BPM | BODY MASS INDEX: 28.7 KG/M2 | OXYGEN SATURATION: 97 %

## 2025-02-26 VITALS — DIASTOLIC BLOOD PRESSURE: 68 MMHG | SYSTOLIC BLOOD PRESSURE: 110 MMHG

## 2025-02-26 VITALS — HEART RATE: 115 BPM

## 2025-02-26 VITALS — DIASTOLIC BLOOD PRESSURE: 72 MMHG | SYSTOLIC BLOOD PRESSURE: 108 MMHG | HEART RATE: 130 BPM

## 2025-02-26 DIAGNOSIS — U09.9 POST COVID-19 CONDITION, UNSPECIFIED: ICD-10-CM

## 2025-02-26 DIAGNOSIS — R00.0 TACHYCARDIA, UNSPECIFIED: ICD-10-CM

## 2025-02-26 PROCEDURE — 99214 OFFICE O/P EST MOD 30 MIN: CPT

## 2025-02-26 PROCEDURE — 93000 ELECTROCARDIOGRAM COMPLETE: CPT

## 2025-02-26 RX ORDER — METOPROLOL TARTRATE 25 MG/1
25 TABLET ORAL
Qty: 45 | Refills: 1 | Status: ACTIVE | COMMUNITY
Start: 2025-02-26 | End: 1900-01-01

## 2025-02-26 RX ORDER — MUPIROCIN 20 MG/G
2 OINTMENT TOPICAL TWICE DAILY
Qty: 1 | Refills: 0 | Status: COMPLETED | COMMUNITY
Start: 2025-02-24 | End: 2025-02-26

## 2025-02-26 RX ORDER — CEPHALEXIN 500 MG/1
500 CAPSULE ORAL 3 TIMES DAILY
Qty: 21 | Refills: 0 | Status: COMPLETED | COMMUNITY
Start: 2025-02-24 | End: 2025-02-26

## 2025-03-03 LAB
BASOPHILS # BLD AUTO: 0.04 K/UL
BASOPHILS NFR BLD AUTO: 0.6 %
DEPRECATED D DIMER PPP IA-ACNC: 193 NG/ML DDU
EOSINOPHIL # BLD AUTO: 0.18 K/UL
EOSINOPHIL NFR BLD AUTO: 2.7 %
HCT VFR BLD CALC: 37.3 %
HGB BLD-MCNC: 12.3 G/DL
IMM GRANULOCYTES NFR BLD AUTO: 0.5 %
LYMPHOCYTES # BLD AUTO: 1.49 K/UL
LYMPHOCYTES NFR BLD AUTO: 22.4 %
MAN DIFF?: NORMAL
MCHC RBC-ENTMCNC: 28.7 PG
MCHC RBC-ENTMCNC: 33 G/DL
MCV RBC AUTO: 87.1 FL
MONOCYTES # BLD AUTO: 0.61 K/UL
MONOCYTES NFR BLD AUTO: 9.2 %
NEUTROPHILS # BLD AUTO: 4.3 K/UL
NEUTROPHILS NFR BLD AUTO: 64.6 %
PLATELET # BLD AUTO: 338 K/UL
RBC # BLD: 4.28 M/UL
RBC # FLD: 12.4 %
WBC # FLD AUTO: 6.65 K/UL

## 2025-03-13 ENCOUNTER — APPOINTMENT (OUTPATIENT)
Dept: CARDIOLOGY | Facility: CLINIC | Age: 30
End: 2025-03-13

## 2025-07-31 ENCOUNTER — NON-APPOINTMENT (OUTPATIENT)
Age: 30
End: 2025-07-31

## 2025-08-25 ENCOUNTER — TRANSCRIPTION ENCOUNTER (OUTPATIENT)
Age: 30
End: 2025-08-25

## 2025-09-11 ENCOUNTER — APPOINTMENT (OUTPATIENT)
Dept: OTOLARYNGOLOGY | Facility: CLINIC | Age: 30
End: 2025-09-11
Payer: COMMERCIAL

## 2025-09-11 VITALS
BODY MASS INDEX: 28.7 KG/M2 | HEART RATE: 84 BPM | SYSTOLIC BLOOD PRESSURE: 110 MMHG | OXYGEN SATURATION: 98 % | TEMPERATURE: 97.4 F | DIASTOLIC BLOOD PRESSURE: 70 MMHG | WEIGHT: 162 LBS | HEIGHT: 63 IN

## 2025-09-11 PROCEDURE — 99213 OFFICE O/P EST LOW 20 MIN: CPT | Mod: 25

## 2025-09-11 PROCEDURE — 31575 DIAGNOSTIC LARYNGOSCOPY: CPT

## 2025-09-11 PROCEDURE — 69210 REMOVE IMPACTED EAR WAX UNI: CPT | Mod: LT,59
